# Patient Record
Sex: FEMALE | Race: BLACK OR AFRICAN AMERICAN | Employment: FULL TIME | URBAN - METROPOLITAN AREA
[De-identification: names, ages, dates, MRNs, and addresses within clinical notes are randomized per-mention and may not be internally consistent; named-entity substitution may affect disease eponyms.]

---

## 2018-02-16 ENCOUNTER — HOSPITAL ENCOUNTER (OUTPATIENT)
Dept: LAB | Age: 23
Discharge: HOME | End: 2018-02-16

## 2018-02-16 ENCOUNTER — HOSPITAL ENCOUNTER (EMERGENCY)
Age: 23
Discharge: HOME | End: 2018-02-16
Attending: EMERGENCY MEDICINE

## 2018-02-16 VITALS
DIASTOLIC BLOOD PRESSURE: 75 MMHG | TEMPERATURE: 99.2 F | HEART RATE: 67 BPM | BODY MASS INDEX: 22.62 KG/M2 | HEIGHT: 72 IN | WEIGHT: 167 LBS | SYSTOLIC BLOOD PRESSURE: 125 MMHG | OXYGEN SATURATION: 98 % | RESPIRATION RATE: 20 BRPM

## 2018-02-16 DIAGNOSIS — J06.9 ACUTE UPPER RESPIRATORY INFECTION: Primary | ICD-10-CM

## 2018-02-16 LAB — S PYO AG THROAT QL: NEGATIVE

## 2018-02-16 PROCEDURE — 87070 CULTURE OTHR SPECIMN AEROBIC: CPT | Performed by: EMERGENCY MEDICINE

## 2018-02-16 RX ORDER — CHLORPHENIRAMINE MALEATE 4 MG
4 TABLET ORAL
Qty: 20 TAB | Refills: 0 | Status: SHIPPED | OUTPATIENT
Start: 2018-02-16 | End: 2018-12-26

## 2018-02-16 RX ORDER — DESOGESTREL AND ETHINYL ESTRADIOL 0.15-0.03
1 KIT ORAL DAILY
COMMUNITY
End: 2020-01-08

## 2018-02-17 NOTE — UC PROVIDER NOTE
Patient is a 20 y.o. female presenting with cold symptoms. The history is provided by the patient. Cold Symptoms    This is a new (started off as rhinorrhea, nasal/sinus congestion, post nasal drainage. sxs progressed, and pt became more fatigued, bodyaches, sore throat, swollen glands.) problem. Episode onset: 3 days ago. The problem has been gradually worsening. Maximum temperature: \"low grade\" 99. Associated symptoms include congestion, ear pain, rhinorrhea, sore throat and cough. Pertinent negatives include no abdominal pain, no diarrhea, no nausea, no vomiting, no neck pain, no rash and no wheezing. Associated symptoms comments: She works in [employer] and has been exposed to various illnesses. Treatments tried: OTC cough/cold medications. The treatment provided moderate relief. Past Medical History:   Diagnosis Date    Bronchiolitis 3/3/2010    Eczema 3/3/2010    ROM (ruptured membranes) 3/3/2010    Strep throat 3/3/2010    UTI (urinary tract infection) 3/3/2010        No past surgical history on file. No family history on file. Social History     Social History    Marital status: SINGLE     Spouse name: N/A    Number of children: N/A    Years of education: N/A     Occupational History    Not on file. Social History Main Topics    Smoking status: Never Smoker    Smokeless tobacco: Never Used    Alcohol use No    Drug use: No    Sexual activity: No     Other Topics Concern    Not on file     Social History Narrative                ALLERGIES: Amoxicillin and Griseofulvin    Review of Systems   Constitutional: Positive for chills, fatigue and fever. HENT: Positive for congestion, ear pain, postnasal drip, rhinorrhea, sinus pressure and sore throat. Negative for facial swelling. Respiratory: Positive for cough. Negative for wheezing. Gastrointestinal: Negative for abdominal pain, diarrhea, nausea and vomiting. Musculoskeletal: Positive for myalgias.  Negative for neck pain and neck stiffness. Skin: Negative for rash. Vitals:    02/16/18 1841   BP: 125/75   Pulse: 67   Resp: 20   Temp: 99.2 °F (37.3 °C)   SpO2: 98%   Weight: 75.8 kg (167 lb)   Height: 6' (1.829 m)       Physical Exam   Constitutional: She is oriented to person, place, and time. She appears well-developed and well-nourished. No distress. HENT:   Nose: Rhinorrhea present. Mouth/Throat: Oropharynx is clear and moist and mucous membranes are normal. No oropharyngeal exudate or posterior oropharyngeal erythema. Eyes: Conjunctivae and EOM are normal. Pupils are equal, round, and reactive to light. Right eye exhibits no discharge. Left eye exhibits no discharge. No scleral icterus. Neck: Normal range of motion. Neck supple. No JVD present. Cardiovascular: Normal rate, regular rhythm, normal heart sounds and intact distal pulses. No murmur heard. Pulmonary/Chest: Effort normal and breath sounds normal. No stridor. No respiratory distress. She has no wheezes. She has no rales. Abdominal: Soft. Bowel sounds are normal. She exhibits no distension. There is no tenderness. There is no rebound and no guarding. Musculoskeletal: She exhibits no edema or tenderness. Lymphadenopathy:     She has cervical adenopathy. Right cervical: Posterior cervical adenopathy present. Left cervical: Posterior cervical adenopathy present. Neurological: She is alert and oriented to person, place, and time. Skin: Skin is warm and dry. No rash noted. She is not diaphoretic. Psychiatric: She has a normal mood and affect. Nursing note and vitals reviewed. MDM     Differential Diagnosis; Clinical Impression; Plan:     Flu-like illness. Outside of window for tamiflu. Strep negative. Pt nontoxic, afebrile here, with reassuring exam. Supportive care.        Procedures

## 2018-02-17 NOTE — DISCHARGE INSTRUCTIONS
Upper Respiratory Infection (Cold): Care Instructions  Your Care Instructions    An upper respiratory infection, or URI, is an infection of the nose, sinuses, or throat. URIs are spread by coughs, sneezes, and direct contact. The common cold is the most frequent kind of URI. The flu and sinus infections are other kinds of URIs. Almost all URIs are caused by viruses. Antibiotics won't cure them. But you can treat most infections with home care. This may include drinking lots of fluids and taking over-the-counter pain medicine. You will probably feel better in 4 to 10 days. The doctor has checked you carefully, but problems can develop later. If you notice any problems or new symptoms, get medical treatment right away. Follow-up care is a key part of your treatment and safety. Be sure to make and go to all appointments, and call your doctor if you are having problems. It's also a good idea to know your test results and keep a list of the medicines you take. How can you care for yourself at home? · To prevent dehydration, drink plenty of fluids, enough so that your urine is light yellow or clear like water. Choose water and other caffeine-free clear liquids until you feel better. If you have kidney, heart, or liver disease and have to limit fluids, talk with your doctor before you increase the amount of fluids you drink. · Take an over-the-counter pain medicine, such as acetaminophen (Tylenol), ibuprofen (Advil, Motrin), or naproxen (Aleve). Read and follow all instructions on the label. · Before you use cough and cold medicines, check the label. These medicines may not be safe for young children or for people with certain health problems. · Be careful when taking over-the-counter cold or flu medicines and Tylenol at the same time. Many of these medicines have acetaminophen, which is Tylenol. Read the labels to make sure that you are not taking more than the recommended dose.  Too much acetaminophen (Tylenol) can be harmful. · Get plenty of rest.  · Do not smoke or allow others to smoke around you. If you need help quitting, talk to your doctor about stop-smoking programs and medicines. These can increase your chances of quitting for good. When should you call for help? Call 911 anytime you think you may need emergency care. For example, call if:  ? · You have severe trouble breathing. ?Call your doctor now or seek immediate medical care if:  ? · You seem to be getting much sicker. ? · You have new or worse trouble breathing. ? · You have a new or higher fever. ? · You have a new rash. ? Watch closely for changes in your health, and be sure to contact your doctor if:  ? · You have a new symptom, such as a sore throat, an earache, or sinus pain. ? · You cough more deeply or more often, especially if you notice more mucus or a change in the color of your mucus. ? · You do not get better as expected. Where can you learn more? Go to http://michael-jordan.info/. Enter J935 in the search box to learn more about \"Upper Respiratory Infection (Cold): Care Instructions. \"  Current as of: May 12, 2017  Content Version: 11.4  © 7658-1034 [employer]. Care instructions adapted under license by [employer] (which disclaims liability or warranty for this information). If you have questions about a medical condition or this instruction, always ask your healthcare professional. [place] [address] any warranty or liability for your use of this information.

## 2018-02-18 LAB
BACTERIA SPEC CULT: NORMAL
SERVICE CMNT-IMP: NORMAL

## 2018-12-18 ENCOUNTER — OFFICE VISIT (OUTPATIENT)
Dept: URGENT CARE | Age: 23
End: 2018-12-18

## 2018-12-18 VITALS
HEIGHT: 71 IN | OXYGEN SATURATION: 99 % | BODY MASS INDEX: 25.76 KG/M2 | SYSTOLIC BLOOD PRESSURE: 124 MMHG | HEART RATE: 93 BPM | TEMPERATURE: 97.6 F | RESPIRATION RATE: 16 BRPM | WEIGHT: 184 LBS | DIASTOLIC BLOOD PRESSURE: 74 MMHG

## 2018-12-18 DIAGNOSIS — J01.90 ACUTE SINUSITIS, RECURRENCE NOT SPECIFIED, UNSPECIFIED LOCATION: ICD-10-CM

## 2018-12-18 DIAGNOSIS — J20.9 ACUTE BRONCHITIS, UNSPECIFIED ORGANISM: Primary | ICD-10-CM

## 2018-12-18 LAB
HCG URINE, QL. (POC): NEGATIVE
VALID INTERNAL CONTROL?: YES

## 2018-12-18 RX ORDER — GUAIFENESIN 600 MG/1
600 TABLET, EXTENDED RELEASE ORAL 2 TIMES DAILY
Qty: 14 TAB | Refills: 0 | Status: SHIPPED | OUTPATIENT
Start: 2018-12-18 | End: 2018-12-25

## 2018-12-18 RX ORDER — PREDNISONE 20 MG/1
TABLET ORAL
Qty: 8 TAB | Refills: 0 | Status: SHIPPED | OUTPATIENT
Start: 2018-12-18 | End: 2018-12-26

## 2018-12-18 RX ORDER — AZITHROMYCIN 250 MG/1
TABLET, FILM COATED ORAL
Qty: 6 TAB | Refills: 0 | Status: SHIPPED | OUTPATIENT
Start: 2018-12-18 | End: 2018-12-26

## 2018-12-18 NOTE — LETTER
NOTIFICATION RETURN TO WORK / SCHOOL 
 
12/18/2018 3:37 PM 
 
Ms. Isidra Medina [address] [id number] To Whom It May Concern: 
 
Isidra Medina is currently under the care of [address] [place] [address]. She will return to work/school on: 12/20 OR 12/21/2018 If there are questions or concerns please have the patient contact our office. Sincerely, [facility] PROVIDER Eleazar Arevalo NP

## 2018-12-18 NOTE — PROGRESS NOTES
Cough and nasal congestion for almost 1 month  Constant, all day long. No aggravating or alleviating factors  It is dry to productive  Has been drying OTC mucinex without much relief. Denies fever, chills, pleuritic pain, SOB, nausea or vomiting   has a past medical history of Bronchiolitis, Eczema, , Strep throat, and UTI (urinary tract infection). LMP 2 weeks ago. Past Medical History:   Diagnosis Date    Bronchiolitis 3/3/2010    Eczema 3/3/2010    ROM (ruptured membranes) 3/3/2010    Strep throat 3/3/2010    UTI (urinary tract infection) 3/3/2010        Past Surgical History:   Procedure Laterality Date    HX HEENT      lasik          History reviewed. No pertinent family history. Social History     Socioeconomic History    Marital status: SINGLE     Spouse name: Not on file    Number of children: Not on file    Years of education: Not on file    Highest education level: Not on file   Social Needs    Financial resource strain: Not on file    Food insecurity - worry: Not on file    Food insecurity - inability: Not on file    Transportation needs - medical: Not on file   [employer] needs - non-medical: Not on file   Occupational History    Not on file   Tobacco Use    Smoking status: Never Smoker    Smokeless tobacco: Never Used   Substance and Sexual Activity    Alcohol use: No    Drug use: No    Sexual activity: No   Other Topics Concern    Not on file   Social History Narrative    Not on file                ALLERGIES: Amoxicillin and Griseofulvin    Review of Systems   Neurological: Negative for dizziness. All other systems reviewed and are negative. Vitals:    12/18/18 1501   BP: 124/74   Pulse: 93   Resp: 16   Temp: 97.6 °F (36.4 °C)   SpO2: 99%   Weight: 184 lb (83.5 kg)   Height: 5' 11\" (1.803 m)       Physical Exam   Constitutional: She is oriented to person, place, and time. No distress.    Non-toxic appearing, well hydrated   HENT:   Greenish whitish mucus in nasal passages bilat with decreased nasal patency. OP with post nasal drip otherwise no lesion or exudate. TMs normal.   Eyes: Conjunctivae and EOM are normal. Pupils are equal, round, and reactive to light. No scleral icterus. Neck: Normal range of motion. Neck supple. Cardiovascular: Normal rate, regular rhythm and normal heart sounds. Exam reveals no gallop and no friction rub. No murmur heard. Pulmonary/Chest: Effort normal and breath sounds normal. No respiratory distress. She has no wheezes. She has no rales. Lymphadenopathy:     She has no cervical adenopathy. Neurological: She is alert and oriented to person, place, and time. Skin: Skin is warm and dry. No rash noted. She is not diaphoretic. No erythema. No pallor. Psychiatric: She has a normal mood and affect. Her behavior is normal. Thought content normal.   Nursing note and vitals reviewed. MDM     Differential Diagnosis; Clinical Impression; Plan:       CLINICAL IMPRESSION:  (J20.9) Acute bronchitis, unspecified organism  (primary encounter diagnosis)  (J01.90) Acute sinusitis, recurrence not specified, unspecified location    Orders Placed This Encounter      XR CHEST PA LAT      AMB POC URINE PREGNANCY TEST, VISUAL COLOR COMPARISON    RX      azithromycin (ZITHROMAX) 250 mg tablet      predniSONE (DELTASONE) 20 mg tablet      guaiFENesin ER (MUCINEX) 600 mg ER tablet      Plan:   1. UPT negative. See above orders  2. Additionally advised humidified air at night, maintaining adequate fluid intake  3. CXR no acute process  4. Review handouts      We have reviewed concerning signs/symptoms, normal vs abnormal progression of medical condition and when to seek immediate medical attention. Schedule with PCP or Urgent Care immediately for worsening or new symptoms. See your PCP if there is not at least some improvement in symptoms within the next 1 week  You should see your PCP for updates on your routine health maintenance. XR Results (most recent):  Results from Appointment encounter on 12/18/18   XR CHEST PA LAT    Narrative INDICATION:   cough     EXAM:  PA and Lateral Chest Radiographs    COMPARISON: None    FINDINGS:    PA and lateral views of the chest demonstrate a normal cardiomediastinal  silhouette. The lungs are adequately expanded. There is no edema, effusion,  consolidation, or pneumothorax. The osseous structures are unremarkable. Metallic nipple bars are noted. Impression IMPRESSION:  No acute process.           Procedures

## 2018-12-18 NOTE — PATIENT INSTRUCTIONS
Follow up with PCP without improvement over next 5-7 days sooner/immediately for new or worsening       Bronchitis: Care Instructions  Your Care Instructions    Bronchitis is inflammation of the bronchial tubes, which carry air to the lungs. The tubes swell and produce mucus, or phlegm. The mucus and inflamed bronchial tubes make you cough. You may have trouble breathing. Most cases of bronchitis are caused by viruses like those that cause colds. Antibiotics usually do not help and they may be harmful. Bronchitis usually develops rapidly and lasts about 2 to 3 weeks in otherwise healthy people. Follow-up care is a key part of your treatment and safety. Be sure to make and go to all appointments, and call your doctor if you are having problems. It's also a good idea to know your test results and keep a list of the medicines you take. How can you care for yourself at home? · Take all medicines exactly as prescribed. Call your doctor if you think you are having a problem with your medicine. · Get some extra rest.  · Take an over-the-counter pain medicine, such as acetaminophen (Tylenol), ibuprofen (Advil, Motrin), or naproxen (Aleve) to reduce fever and relieve body aches. Read and follow all instructions on the label. · Do not take two or more pain medicines at the same time unless the doctor told you to. Many pain medicines have acetaminophen, which is Tylenol. Too much acetaminophen (Tylenol) can be harmful. · Take an over-the-counter cough medicine that contains dextromethorphan to help quiet a dry, hacking cough so that you can sleep. Avoid cough medicines that have more than one active ingredient. Read and follow all instructions on the label. · Breathe moist air from a humidifier, hot shower, or sink filled with hot water. The heat and moisture will thin mucus so you can cough it out. · Do not smoke. Smoking can make bronchitis worse.  If you need help quitting, talk to your doctor about stop-smoking programs and medicines. These can increase your chances of quitting for good. When should you call for help? Call 911 anytime you think you may need emergency care. For example, call if:    · You have severe trouble breathing.    Call your doctor now or seek immediate medical care if:    · You have new or worse trouble breathing.     · You cough up dark brown or bloody mucus (sputum).     · You have a new or higher fever.     · You have a new rash.    Watch closely for changes in your health, and be sure to contact your doctor if:    · You cough more deeply or more often, especially if you notice more mucus or a change in the color of your mucus.     · You are not getting better as expected. Where can you learn more? Go to http://michael-jordan.info/. Enter H333 in the search box to learn more about \"Bronchitis: Care Instructions. \"  Current as of: December 6, 2017  Content Version: 11.8  © 0499-8064 [employer]. Care instructions adapted under license by [employer] (which disclaims liability or warranty for this information). If you have questions about a medical condition or this instruction, always ask your healthcare professional. [place] [address] any warranty or liability for your use of this information. Sinusitis: Care Instructions  Your Care Instructions    Sinusitis is an infection of the lining of the sinus cavities in your head. Sinusitis often follows a cold. It causes pain and pressure in your head and face. In most cases, sinusitis gets better on its own in 1 to 2 weeks. But some mild symptoms may last for several weeks. Sometimes antibiotics are needed. Follow-up care is a key part of your treatment and safety. Be sure to make and go to all appointments, and call your doctor if you are having problems. It's also a good idea to know your test results and keep a list of the medicines you take.   How can you care for yourself at home? · Take an over-the-counter pain medicine, such as acetaminophen (Tylenol), ibuprofen (Advil, Motrin), or naproxen (Aleve). Read and follow all instructions on the label. · If the doctor prescribed antibiotics, take them as directed. Do not stop taking them just because you feel better. You need to take the full course of antibiotics. · Be careful when taking over-the-counter cold or flu medicines and Tylenol at the same time. Many of these medicines have acetaminophen, which is Tylenol. Read the labels to make sure that you are not taking more than the recommended dose. Too much acetaminophen (Tylenol) can be harmful. · Breathe warm, moist air from a steamy shower, a hot bath, or a sink filled with hot water. Avoid cold, dry air. Using a humidifier in your home may help. Follow the directions for cleaning the machine. · Use saline (saltwater) nasal washes to help keep your nasal passages open and wash out mucus and bacteria. You can buy saline nose drops at a grocery store or drugstore. Or you can make your own at home by adding 1 teaspoon of salt and 1 teaspoon of baking soda to 2 cups of distilled water. If you make your own, fill a bulb syringe with the solution, insert the tip into your nostril, and squeeze gently. [place] your nose. · Put a hot, wet towel or a warm gel pack on your face 3 or 4 times a day for 5 to 10 minutes each time. · Try a decongestant nasal spray like oxymetazoline (Afrin). Do not use it for more than 3 days in a row. Using it for more than 3 days can make your congestion worse. When should you call for help?   Call your doctor now or seek immediate medical care if:    · You have new or worse swelling or redness in your face or around your eyes.     · You have a new or higher fever.    Watch closely for changes in your health, and be sure to contact your doctor if:    · You have new or worse facial pain.     · The mucus from your nose becomes thicker (like pus) or has new blood in it.     · You are not getting better as expected. Where can you learn more? Go to http://michael-jordan.info/. Enter S829 in the search box to learn more about \"Sinusitis: Care Instructions. \"  Current as of: March 28, 2018  Content Version: 11.8  © 9696-3572 [employer]. Care instructions adapted under license by [employer] (which disclaims liability or warranty for this information). If you have questions about a medical condition or this instruction, always ask your healthcare professional. [address] any warranty or liability for your use of this information.

## 2018-12-26 ENCOUNTER — OFFICE VISIT (OUTPATIENT)
Dept: URGENT CARE | Age: 23
End: 2018-12-26

## 2018-12-26 VITALS
OXYGEN SATURATION: 98 % | WEIGHT: 182 LBS | HEIGHT: 71 IN | DIASTOLIC BLOOD PRESSURE: 78 MMHG | BODY MASS INDEX: 25.48 KG/M2 | TEMPERATURE: 98.1 F | HEART RATE: 92 BPM | RESPIRATION RATE: 16 BRPM | SYSTOLIC BLOOD PRESSURE: 147 MMHG

## 2018-12-26 DIAGNOSIS — J02.9 SORE THROAT: ICD-10-CM

## 2018-12-26 DIAGNOSIS — M79.602 LEFT ARM PAIN: Primary | ICD-10-CM

## 2018-12-26 DIAGNOSIS — L04.9 LYMPHADENITIS, ACUTE: ICD-10-CM

## 2018-12-26 RX ORDER — CLINDAMYCIN HYDROCHLORIDE 300 MG/1
300 CAPSULE ORAL 3 TIMES DAILY
Qty: 30 CAP | Refills: 0 | Status: SHIPPED | OUTPATIENT
Start: 2018-12-26 | End: 2019-01-05

## 2018-12-26 NOTE — PATIENT INSTRUCTIONS
Contusion: Care Instructions  Your Care Instructions    Contusion is the medical term for a bruise. It is the result of a direct blow or an impact, such as a fall. Contusions are common sports injuries. Most people think of a bruise as a black-and-blue spot. This happens when small blood vessels get torn and leak blood under the skin. But bones, muscles, and organs can also get bruised. This may damage deep tissues but not cause a bruise you can see. The doctor will do a physical exam to find the location of your contusion. You may also have tests to make sure you do not have a more serious injury, such as a broken bone or nerve damage. These may include X-rays or other imaging tests like a CT scan or MRI. Deep-tissue contusions may cause pain and swelling. But if there is no serious damage, they will often get better in a few weeks with home treatment. The doctor has checked you carefully, but problems can develop later. If you notice any problems or new symptoms, get medical treatment right away. Follow-up care is a key part of your treatment and safety. Be sure to make and go to all appointments, and call your doctor if you are having problems. It's also a good idea to know your test results and keep a list of the medicines you take. How can you care for yourself at home? · Put ice or a cold pack on the sore area for 10 to 20 minutes at a time to stop swelling. Put a thin cloth between the ice pack and your skin. · Be safe with medicines. Read and follow all instructions on the label. ? If the doctor gave you a prescription medicine for pain, take it as prescribed. ? If you are not taking a prescription pain medicine, ask your doctor if you can take an over-the-counter medicine. · If you can, prop up the sore area on pillows as much as possible for the next few days. Try to keep the sore area above the level of your heart. When should you call for help?   Call your doctor now or seek immediate medical care if:    · Your pain gets worse.     · You have new or worse swelling.     · You have tingling, weakness, or numbness in the area near the contusion.     · The area near the contusion is cold or pale.    Watch closely for changes in your health, and be sure to contact your doctor if:    · You do not get better as expected. Where can you learn more? Go to http://michael-jordan.info/. Enter M401 in the search box to learn more about \"Contusion: Care Instructions. \"  Current as of: November 20, 2017  Content Version: 11.8  © 2674-8614 [employer]. Care instructions adapted under license by [employer] (which disclaims liability or warranty for this information). If you have questions about a medical condition or this instruction, always ask your healthcare professional. [address] any warranty or liability for your use of this information. Lymphadenitis: Care Instructions  Your Care Instructions  Lymph nodes are small, bean-shaped glands throughout the body. They help the body fight germs and infections. Lymphadenitis is a swelling of a lymph node. It can be caused by an infection or other condition. The infection is most often in a nearby part of the body. A common example is the lumps on both sides of your neck under the jaw that get tender and bigger when you have a cold or sore throat. Sometimes the lymph node itself may be infected. Usually the swollen lymph nodes go back to normal size without a problem. Treatment, if needed, focuses on treating the cause. For example, a bacterial infection may be treated with antibiotics. This should bring the node back to normal size. An infection caused by a virus often goes away on its own. In rare cases, a badly infected node may need to be drained by your doctor. Follow-up care is a key part of your treatment and safety.  Be sure to make and go to all appointments, and call your doctor if you are having problems. It's also a good idea to know your test results and keep a list of the medicines you take. How can you care for yourself at home? · Be safe with medicines. ? If your doctor prescribed antibiotics, take them as directed. Do not stop taking them just because you feel better. You need to take the full course of antibiotics. ? Ask your doctor if you can take an over-the-counter pain medicine, such as acetaminophen (Tylenol), ibuprofen (Advil, Motrin), or naproxen (Aleve). Read and follow all instructions on the label. · If you have pain, try a warm compress. Soak a towel or washcloth in warm water. Wring it out, and place it on the affected skin. · Do not squeeze, drain, or puncture a painful lump. Doing this can irritate or inflame the lump, push any existing infection deeper into the skin, or cause severe bleeding. When should you call for help? Call your doctor now or seek immediate medical care if:    · Your lymph nodes get bigger.     · The area becomes red and feels more tender.     · You have a fever that does not go away.    Watch closely for changes in your health, and be sure to contact your doctor if:    · You do not get better as expected. Where can you learn more? Go to http://michael-jordan.info/. Enter Y887 in the search box to learn more about \"Lymphadenitis: Care Instructions. \"  Current as of: November 18, 2017  Content Version: 11.8  © 1251-5647 [employer]. Care instructions adapted under license by [employer] (which disclaims liability or warranty for this information). If you have questions about a medical condition or this instruction, always ask your healthcare professional. [address] any warranty or liability for your use of this information.

## 2018-12-26 NOTE — PROGRESS NOTES
Papa Faulkner is a 20 y.o. female who sustained a left shoulder and left elbow injury yesterday. Mechanism of injury: was running down the stairs and slipped and fell, hyperextending left shoulder and hitting left elbow. Immediate symptoms: immediate pain, was able to use arm directly after injury. Symptoms have been constant since that time. Prior history of related problems: no prior problems with this area in the past. Also complains of left neck gland swelling, pain since yesterday along with ST. Denies fever, cough, congestion. The history is provided by the patient. Past Medical History:   Diagnosis Date    Bronchiolitis 3/3/2010    Eczema 3/3/2010    ROM (ruptured membranes) 3/3/2010    Strep throat 3/3/2010    UTI (urinary tract infection) 3/3/2010        Past Surgical History:   Procedure Laterality Date     HEENT      lasik          History reviewed. No pertinent family history. Social History     Socioeconomic History    Marital status: SINGLE     Spouse name: Not on file    Number of children: Not on file    Years of education: Not on file    Highest education level: Not on file   Social Needs    Financial resource strain: Not on file    Food insecurity - worry: Not on file    Food insecurity - inability: Not on file    Transportation needs - medical: Not on file   [employer] needs - non-medical: Not on file   Occupational History    Not on file   Tobacco Use    Smoking status: Never Smoker    Smokeless tobacco: Never Used   Substance and Sexual Activity    Alcohol use: No    Drug use: No    Sexual activity: No   Other Topics Concern    Not on file   Social History Narrative    Not on file                ALLERGIES: Amoxicillin and Griseofulvin    Review of Systems   Constitutional: Negative for activity change, appetite change, chills and fever. HENT: Positive for sore throat.  Negative for congestion, ear pain, rhinorrhea, sinus pressure, sinus pain and trouble swallowing. Respiratory: Negative for cough, shortness of breath and wheezing. Cardiovascular: Negative for chest pain and palpitations. Gastrointestinal: Negative for nausea and vomiting. Musculoskeletal: Positive for arthralgias. Skin: Negative for color change and rash. Neurological: Negative for dizziness and headaches. Hematological: Positive for adenopathy. Vitals:    12/26/18 1154   BP: 147/78   Pulse: 92   Resp: 16   Temp: 98.1 °F (36.7 °C)   SpO2: 98%   Weight: 182 lb (82.6 kg)   Height: 5' 11\" (1.803 m)       Physical Exam   Constitutional: She appears well-developed and well-nourished. No distress. HENT:   Right Ear: Tympanic membrane, external ear and ear canal normal.   Left Ear: Tympanic membrane, external ear and ear canal normal.   Nose: Nose normal. Right sinus exhibits no maxillary sinus tenderness and no frontal sinus tenderness. Left sinus exhibits no maxillary sinus tenderness and no frontal sinus tenderness. Mouth/Throat: Mucous membranes are normal. Posterior oropharyngeal edema and posterior oropharyngeal erythema present. No oropharyngeal exudate or tonsillar abscesses. Cardiovascular: Normal rate, regular rhythm and normal heart sounds. Pulmonary/Chest: Effort normal and breath sounds normal. No respiratory distress. She has no wheezes. She has no rales. Musculoskeletal:        Left shoulder: She exhibits tenderness, bony tenderness and pain. She exhibits normal range of motion, no swelling, no effusion, no crepitus, no deformity, no laceration, normal pulse and normal strength. Left elbow: She exhibits normal range of motion, no swelling, no effusion, no deformity and no laceration. Tenderness found. Olecranon process tenderness noted. No radial head, no medial epicondyle and no lateral epicondyle tenderness noted. Lymphadenopathy:     She has cervical adenopathy. Right cervical: Superficial cervical adenopathy present. Neurological: She is alert. Skin: She is not diaphoretic. Psychiatric: She has a normal mood and affect. Her behavior is normal. Judgment and thought content normal.   Nursing note and vitals reviewed. MDM    ICD-10-CM ICD-9-CM    1. Left arm pain M79.602 729.5 XR SHOULDER LT AP/LAT MIN 2 V      XR ELBOW LT MIN 3 V    contusion   2. Lymphadenitis, acute L04.9 683    3. Sore throat J02.9 462      Medications Ordered Today   Medications    clindamycin (CLEOCIN) 300 mg capsule     Sig: Take 1 Cap by mouth three (3) times daily for 10 days. Dispense:  30 Cap     Refill:  0     NSAIDs prn    The patients condition was discussed with the patient and they understand. The patient is to follow up with PCP [facility]. If signs and symptoms become worse the pt is to go to the ER. The patient is to take medications as prescribed.      Xrays: unremarkable        Procedures

## 2019-09-18 ENCOUNTER — OFFICE VISIT (OUTPATIENT)
Dept: URGENT CARE | Age: 24
End: 2019-09-18

## 2019-09-18 VITALS
DIASTOLIC BLOOD PRESSURE: 66 MMHG | SYSTOLIC BLOOD PRESSURE: 122 MMHG | HEART RATE: 108 BPM | BODY MASS INDEX: 25.62 KG/M2 | HEIGHT: 71 IN | RESPIRATION RATE: 16 BRPM | TEMPERATURE: 100 F | WEIGHT: 183 LBS | OXYGEN SATURATION: 99 %

## 2019-09-18 DIAGNOSIS — J03.90 ACUTE TONSILLITIS, UNSPECIFIED ETIOLOGY: Primary | ICD-10-CM

## 2019-09-18 DIAGNOSIS — J02.9 SORE THROAT: ICD-10-CM

## 2019-09-18 LAB
S PYO AG THROAT QL: NORMAL
VALID INTERNAL CONTROL?: YES

## 2019-09-18 RX ORDER — AZITHROMYCIN 250 MG/1
TABLET, FILM COATED ORAL
Qty: 6 TAB | Refills: 0 | Status: SHIPPED | OUTPATIENT
Start: 2019-09-18 | End: 2020-01-08

## 2019-09-18 NOTE — LETTER
NOTIFICATION RETURN TO WORK / SCHOOL 
 
9/18/2019 8:10 PM 
 
Ms. Tracie Williamson [address] [id number] To Whom It May Concern: 
 
Tracie Williamson is currently under the care of [facility]. She will return to work/school on: 09/20 OR 09/21/2019 If there are questions or concerns please have the patient contact our office. Sincerely, GHE PROVIDER

## 2019-09-19 NOTE — PATIENT INSTRUCTIONS
Follow up with PCP without improvement over next 4 days sooner/immediately for new or worsening       Tonsillitis: Care Instructions  Your Care Instructions    Tonsillitis is an infection of the tonsils that is caused by bacteria or a virus. The tonsils are in the back of the throat and are part of the immune system. Tonsillitis typically lasts from a few days up to a couple of weeks. Tonsillitis caused by a virus goes away on its own. Tonsillitis caused by the bacteria that causes strep throat is treated with antibiotics. You and your doctor may consider surgery to remove the tonsils (tonsillectomy) if you have serious complications or repeat infections. Follow-up care is a key part of your treatment and safety. Be sure to make and go to all appointments, and call your doctor if you are having problems. It's also a good idea to know your test results and keep a list of the medicines you take. How can you care for yourself at home? · If your doctor prescribed antibiotics, take them as directed. Do not stop taking them just because you feel better. You need to take the full course of antibiotics. · Gargle with warm salt water. This helps reduce swelling and relieve discomfort. Gargle once an hour with 1 teaspoon of salt mixed in 8 fluid ounces of warm water. · Take an over-the-counter pain medicine, such as acetaminophen (Tylenol), ibuprofen (Advil, Motrin), or naproxen (Aleve). Be safe with medicines. Read and follow all instructions on the label. No one younger than 20 should take aspirin. It has been linked to Reye syndrome, a serious illness. · Be careful when taking over-the-counter cold or flu medicines and Tylenol at the same time. Many of these medicines have acetaminophen, which is Tylenol. Read the labels to make sure that you are not taking more than the recommended dose. Too much acetaminophen (Tylenol) can be harmful. · Try an over-the-counter throat spray to relieve throat pain.   · Drink plenty of fluids. Fluids may help soothe an irritated throat. Drink warm or cool liquids (whichever feels better). These include tea, soup, and juice. · Do not smoke, and avoid secondhand smoke. Smoking can make tonsillitis worse. If you need help quitting, talk to your doctor about stop-smoking programs and medicines. These can increase your chances of quitting for good. · Use a vaporizer or humidifier to add moisture to your bedroom. Follow the directions for cleaning the machine. When should you call for help? Call your doctor now or seek immediate medical care if:    · Your pain gets worse on one side of your throat.     · You have a new or higher fever.     · You notice changes in your voice.     · You have trouble opening your mouth.     · You have any trouble breathing.     · You have much more trouble swallowing.     · You have a fever with a stiff neck or a severe headache.     · You are sensitive to light or feel very sleepy or confused.    Watch closely for changes in your health, and be sure to contact your doctor if:    · You do not get better after 2 days. Where can you learn more? Go to http://michael-jordan.info/. Enter S639 in the search box to learn more about \"Tonsillitis: Care Instructions. \"  Current as of: October 21, 2018  Content Version: 12.1  © 2345-0996 Healthwise, Incorporated. Care instructions adapted under license by [employer] (which disclaims liability or warranty for this information). If you have questions about a medical condition or this instruction, always ask your healthcare professional. [address] [address] any warranty or liability for your use of this information.

## 2019-09-19 NOTE — PROGRESS NOTES
Accompanied by mother  Here for sore throat, swollen lymph nodes and fever < 24 hours  Pain 6/10. No trouble swallowing  Constant. hasnt tried meds. 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 Denies rash, cough, headache, voice change  Overall worse. Past Medical History:   Diagnosis Date    Bronchiolitis 3/3/2010    Eczema 3/3/2010    ROM (ruptured membranes) 3/3/2010    Strep throat 3/3/2010    UTI (urinary tract infection) 3/3/2010        Past Surgical History:   Procedure Laterality Date    HX HEENT      lasik          History reviewed. No pertinent family history.      Social History     Socioeconomic History    Marital status: SINGLE     Spouse name: Not on file    Number of children: Not on file    Years of education: Not on file    Highest education level: Not on file   Occupational History    Not on file   Social Needs    Financial resource strain: Not on file    Food insecurity:     Worry: Not on file     Inability: Not on file    Transportation needs:     Medical: Not on file     Non-medical: Not on file   Tobacco Use    Smoking status: Never Smoker    Smokeless tobacco: Never Used   Substance and Sexual Activity    Alcohol use: No    Drug use: No    Sexual activity: Never   Lifestyle    Physical activity:     Days per week: Not on file     Minutes per session: Not on file    Stress: Not on file   Relationships    Social connections:     Talks on phone: Not on file     Gets together: Not on file     Attends Islam service: Not on file     Active member of club or organization: Not on file     Attends meetings of clubs or organizations: Not on file     Relationship status: Not on file    Intimate partner violence:     Fear of current or ex partner: Not on file     Emotionally abused: Not on file     Physically abused: Not on file     Forced sexual activity: Not on file   Other Topics Concern    Not on file   Social History Narrative    Not on file ALLERGIES: Amoxicillin and Griseofulvin    Review of Systems   All other systems reviewed and are negative. Vitals:    09/18/19 1948   BP: 122/66   Pulse: (!) 108   Resp: 16   Temp: 100 °F (37.8 °C)   SpO2: 99%   Weight: 183 lb (83 kg)   Height: 5' 11\" (1.803 m)       Physical Exam   Constitutional: She is oriented to person, place, and time. No distress. Non-toxic appearing, well hydrated   HENT:   Tonsils 2+ erythematous. Uvula midline. TMs normal. Mucus membranes moist.    Eyes: Pupils are equal, round, and reactive to light. Conjunctivae and EOM are normal. No scleral icterus. Neck: Normal range of motion. Neck supple. Cardiovascular: Normal rate, regular rhythm and normal heart sounds. Exam reveals no gallop and no friction rub. No murmur heard. Pulmonary/Chest: Effort normal and breath sounds normal. No respiratory distress. She has no wheezes. She has no rales. Lymphadenopathy:     She has cervical adenopathy. Neurological: She is alert and oriented to person, place, and time. Skin: Skin is warm and dry. No rash noted. She is not diaphoretic. No erythema. No pallor. Psychiatric: She has a normal mood and affect. Her behavior is normal. Thought content normal.   Nursing note and vitals reviewed. MDM     Differential Diagnosis; Clinical Impression; Plan:       CLINICAL IMPRESSION:  (J03.90) Acute tonsillitis, unspecified etiology  (primary encounter diagnosis)  (J02.9) Sore throat    Orders Placed This Encounter      azithromycin (ZITHROMAX) 250 mg tablet          Sig: Take 2 tablets on day 1 then 1 tablet per day for remaining 4 days. Take by mouth.           Dispense:  6 Tab          Refill:  0      Plan:  Rapid strep negative  Will send culture  Start abx while awaiting culture  Fluids, motrin, tylenol, gargles  Review handouts      We have reviewed concerning signs/symptoms, normal vs abnormal progression of medical condition and when to seek immediate medical attention. Schedule with PCP or Urgent Care immediately for worsening or new symptoms. See your PCP if there is not at least some improvement in symptoms within the next 4-5 days. You should see your PCP for updates on your routine health maintenance.              Procedures

## 2019-09-21 LAB
BACTERIA SPEC RESP CULT: ABNORMAL
BACTERIA SPEC RESP CULT: ABNORMAL

## 2020-01-08 ENCOUNTER — OFFICE VISIT (OUTPATIENT)
Dept: URGENT CARE | Age: 25
End: 2020-01-08

## 2020-01-08 VITALS
RESPIRATION RATE: 16 BRPM | HEIGHT: 71 IN | WEIGHT: 185 LBS | DIASTOLIC BLOOD PRESSURE: 70 MMHG | BODY MASS INDEX: 25.9 KG/M2 | SYSTOLIC BLOOD PRESSURE: 143 MMHG | HEART RATE: 88 BPM | OXYGEN SATURATION: 96 % | TEMPERATURE: 98.3 F

## 2020-01-08 DIAGNOSIS — J06.9 VIRAL UPPER RESPIRATORY TRACT INFECTION: Primary | ICD-10-CM

## 2020-01-08 LAB
S PYO AG THROAT QL: NEGATIVE
VALID INTERNAL CONTROL?: YES

## 2020-01-08 RX ORDER — PREDNISONE 20 MG/1
20 TABLET ORAL 2 TIMES DAILY
Qty: 10 TAB | Refills: 0 | Status: SHIPPED | OUTPATIENT
Start: 2020-01-08 | End: 2020-01-13

## 2020-01-08 NOTE — PROGRESS NOTES
Cold Symptoms   The history is provided by the patient. This is a new problem. The current episode started more than 2 days ago. The problem occurs constantly. The problem has been gradually worsening. The cough is non-productive. There has been no fever. Associated symptoms include headaches, rhinorrhea and sore throat. Pertinent negatives include no shortness of breath and no wheezing. She has tried nothing for the symptoms. She is not a smoker. Her past medical history does not include asthma. Past Medical History:   Diagnosis Date    Bronchiolitis 3/3/2010    Eczema 3/3/2010    ROM (ruptured membranes) 3/3/2010    Strep throat 3/3/2010    UTI (urinary tract infection) 3/3/2010        Past Surgical History:   Procedure Laterality Date    HX HEENT      lasik          History reviewed. No pertinent family history.      Social History     Socioeconomic History    Marital status: SINGLE     Spouse name: Not on file    Number of children: Not on file    Years of education: Not on file    Highest education level: Not on file   Occupational History    Not on file   Social Needs    Financial resource strain: Not on file    Food insecurity:     Worry: Not on file     Inability: Not on file    Transportation needs:     Medical: Not on file     Non-medical: Not on file   Tobacco Use    Smoking status: Never Smoker    Smokeless tobacco: Never Used   Substance and Sexual Activity    Alcohol use: No    Drug use: No    Sexual activity: Never   Lifestyle    Physical activity:     Days per week: Not on file     Minutes per session: Not on file    Stress: Not on file   Relationships    Social connections:     Talks on phone: Not on file     Gets together: Not on file     Attends Adventism service: Not on file     Active member of club or organization: Not on file     Attends meetings of clubs or organizations: Not on file     Relationship status: Not on file    Intimate partner violence:     Fear of current or ex partner: Not on file     Emotionally abused: Not on file     Physically abused: Not on file     Forced sexual activity: Not on file   Other Topics Concern    Not on file   Social History Narrative    Not on file         ALLERGIES: Amoxicillin and Griseofulvin    Review of Systems   HENT: Positive for congestion, rhinorrhea and sore throat. Respiratory: Negative for shortness of breath and wheezing. Neurological: Positive for headaches. All other systems reviewed and are negative. Vitals:    01/08/20 1612   BP: 143/70   Pulse: 88   Resp: 16   Temp: 98.3 °F (36.8 °C)   SpO2: 96%   Weight: 185 lb (83.9 kg)   Height: 5' 11\" (1.803 m)       Physical Exam    MDM    Procedures        ICD-10-CM ICD-9-CM    1. Viral upper respiratory tract infection J06.9 465.9 AMB POC RAPID STREP A    rapid strep- negative       Fluids/ gargles  Claritin/ allegra   Tylenol cold-sinus - max strength 1-2 tab 4 times/ day    with Advil as needed    If not better in 4-5 days - may use prednisone    Medications Ordered Today   Medications    predniSONE (DELTASONE) 20 mg tablet     Sig: Take 20 mg by mouth two (2) times a day for 5 days. With food     Dispense:  10 Tab     Refill:  0     Results for orders placed or performed in visit on 01/08/20   AMB POC RAPID STREP A   Result Value Ref Range    VALID INTERNAL CONTROL POC Yes     Group A Strep Ag Negative Negative     The patients condition was discussed with the patient and they understand. The patient is to follow up with primary care doctor. If signs and symptoms become worse the pt is to go to the ER. The patient is to take medications as prescribed.

## 2020-01-08 NOTE — PATIENT INSTRUCTIONS
Fluids/ gargles  Claritin/ allegra   Tylenol cold-sinus - max strength 1-2 tab 4 times/ day    with Advil as needed    If not better in 4-5 days - may use prednisone       Viral Respiratory Infection: Care Instructions  Your Care Instructions    Viruses are very small organisms. They grow in number after they enter your body. There are many types that cause different illnesses, such as colds and the mumps. The symptoms of a viral respiratory infection often start quickly. They include a fever, sore throat, and runny nose. You may also just not feel well. Or you may not want to eat much. Most viral respiratory infections are not serious. They usually get better with time and self-care. Antibiotics are not used to treat a viral infection. That's because antibiotics will not help cure a viral illness. In some cases, antiviral medicine can help your body fight a serious viral infection. Follow-up care is a key part of your treatment and safety. Be sure to make and go to all appointments, and call your doctor if you are having problems. It's also a good idea to know your test results and keep a list of the medicines you take. How can you care for yourself at home? · Rest as much as possible until you feel better. · Be safe with medicines. Take your medicine exactly as prescribed. Call your doctor if you think you are having a problem with your medicine. You will get more details on the specific medicine your doctor prescribes. · Take an over-the-counter pain medicine, such as acetaminophen (Tylenol), ibuprofen (Advil, Motrin), or naproxen (Aleve), as needed for pain and fever. Read and follow all instructions on the label. Do not give aspirin to anyone younger than 20. It has been linked to Reye syndrome, a serious illness. · Drink plenty of fluids, enough so that your urine is light yellow or clear like water. Hot fluids, such as tea or soup, may help relieve congestion in your nose and throat.  If you have kidney, heart, or liver disease and have to limit fluids, talk with your doctor before you increase the amount of fluids you drink. · Try to clear mucus from your lungs by breathing deeply and coughing. · Gargle with warm salt water once an hour. This can help reduce swelling and throat pain. Use 1 teaspoon of salt mixed in 1 cup of warm water. · Do not smoke or allow others to smoke around you. If you need help quitting, talk to your doctor about stop-smoking programs and medicines. These can increase your chances of quitting for good. To avoid spreading the virus  · Cough or sneeze into a tissue. Then throw the tissue away. · If you don't have a tissue, use your hand to cover your cough or sneeze. Then clean your hand. You can also cough into your sleeve. · Wash your hands often. Use soap and warm water. Wash for 15 to 20 seconds each time. · If you don't have soap and water near you, you can clean your hands with alcohol wipes or gel. When should you call for help? Call your doctor now or seek immediate medical care if:    · You have a new or higher fever.     · Your fever lasts more than 48 hours.     · You have trouble breathing.     · You have a fever with a stiff neck or a severe headache.     · You are sensitive to light.     · You feel very sleepy or confused.    Watch closely for changes in your health, and be sure to contact your doctor if:    · You do not get better as expected. Where can you learn more? Go to http://michael-jordan.info/. Enter K480 in the search box to learn more about \"Viral Respiratory Infection: Care Instructions. \"  Current as of: June 9, 2019  Content Version: 12.2  © 8711-7205 [employer]. Care instructions adapted under license by [employer] (which disclaims liability or warranty for this information).  If you have questions about a medical condition or this instruction, always ask your healthcare professional. [employer] disclaims any warranty or liability for your use of this information.

## 2020-07-17 ENCOUNTER — OFFICE VISIT (OUTPATIENT)
Dept: URGENT CARE | Age: 25
End: 2020-07-17

## 2020-07-17 VITALS — TEMPERATURE: 98.5 F | HEART RATE: 82 BPM | RESPIRATION RATE: 16 BRPM | OXYGEN SATURATION: 97 %

## 2020-07-17 DIAGNOSIS — J02.9 SORE THROAT: Primary | ICD-10-CM

## 2020-07-17 DIAGNOSIS — J06.9 VIRAL UPPER RESPIRATORY TRACT INFECTION: ICD-10-CM

## 2020-07-17 LAB
S PYO AG THROAT QL: NEGATIVE
VALID INTERNAL CONTROL?: YES

## 2020-07-17 NOTE — PROGRESS NOTES
The history is provided by the patient. Sore Throat    The history is provided by the patient. This is a new problem. The current episode started 2 days ago. The problem has not changed since onset. There has been no fever. Associated symptoms include congestion and cough. Pertinent negatives include no ear discharge, no ear pain, no shortness of breath and no trouble swallowing. Associated symptoms comments: Cough, sinus congestion, cough and post nasal drainage. She has tried nothing for the symptoms. Patient states she have a history of allergies. Denies fever or SOB. Past Medical History:   Diagnosis Date    Bronchiolitis 3/3/2010    Eczema 3/3/2010    ROM (ruptured membranes) 3/3/2010    Strep throat 3/3/2010    UTI (urinary tract infection) 3/3/2010        Past Surgical History:   Procedure Laterality Date    HX HEENT      lasik          History reviewed. No pertinent family history.      Social History     Socioeconomic History    Marital status: SINGLE     Spouse name: Not on file    Number of children: Not on file    Years of education: Not on file    Highest education level: Not on file   Occupational History    Not on file   Social Needs    Financial resource strain: Not on file    Food insecurity     Worry: Not on file     Inability: Not on file    Transportation needs     Medical: Not on file     Non-medical: Not on file   Tobacco Use    Smoking status: Never Smoker    Smokeless tobacco: Never Used   Substance and Sexual Activity    Alcohol use: No    Drug use: No    Sexual activity: Never   Lifestyle    Physical activity     Days per week: Not on file     Minutes per session: Not on file    Stress: Not on file   Relationships    Social connections     Talks on phone: Not on file     Gets together: Not on file     Attends [employer] service: Not on file     Active member of club or organization: Not on file     Attends meetings of clubs or organizations: Not on file Relationship status: Not on file    Intimate partner violence     Fear of current or ex partner: Not on file     Emotionally abused: Not on file     Physically abused: Not on file     Forced sexual activity: Not on file   Other Topics Concern    Not on file   Social History Narrative    Not on file          ALLERGIES: Amoxicillin and Griseofulvin    Review of Systems   Constitutional: Negative for chills and fatigue. HENT: Positive for congestion, postnasal drip, sinus pressure and sore throat. Negative for ear discharge, ear pain and trouble swallowing. Respiratory: Positive for cough. Negative for chest tightness, shortness of breath and wheezing. Cardiovascular: Negative. Gastrointestinal: Negative. Musculoskeletal: Positive for myalgias. Neurological: Negative. Vitals:    07/17/20 1238   Pulse: 82   Resp: 16   Temp: 98.5 °F (36.9 °C)   SpO2: 97%       Physical Exam  Constitutional:       General: She is not in acute distress. Appearance: Normal appearance. She is well-developed. She is not ill-appearing. HENT:      Right Ear: Tympanic membrane, ear canal and external ear normal. There is no impacted cerumen. Left Ear: Tympanic membrane, ear canal and external ear normal. There is impacted cerumen. Nose: Congestion present. No rhinorrhea. Mouth/Throat:      Pharynx: No oropharyngeal exudate or posterior oropharyngeal erythema. Comments: +1 tonsils  Neck:      Musculoskeletal: Muscular tenderness present. No neck rigidity. Cardiovascular:      Rate and Rhythm: Normal rate. Heart sounds: Normal heart sounds. Pulmonary:      Effort: Pulmonary effort is normal.      Breath sounds: Normal breath sounds. No wheezing or rhonchi. Lymphadenopathy:      Cervical: No cervical adenopathy. Neurological:      Mental Status: She is alert and oriented to person, place, and time.    Psychiatric:         Mood and Affect: Mood normal.         MDM     Differential Diagnosis; Clinical Impression; Plan:     (J02.9) Sore throat  (primary encounter diagnosis)  No orders of the defined types were placed in this encounter.    - Advised patient to use a daily nasal saline spray, decongestant and antihistamine to help with symptoms. Use tylenol to help with symptoms.  - Symptoms are likely related to seasonal allergies. This patient was seen in [facility] at [address] [place] [address] [facility] while in their vehicle due to COVID-19 pandemic with PPE and focused examination in order to decrease community viral transmission. The patient/guardian gave verbal consent to treat.             Procedures

## 2020-07-20 DIAGNOSIS — J02.0 STREP PHARYNGITIS: Primary | ICD-10-CM

## 2020-07-20 LAB
BACTERIA SPEC RESP CULT: ABNORMAL
BACTERIA SPEC RESP CULT: ABNORMAL

## 2020-07-20 RX ORDER — AZITHROMYCIN 250 MG/1
TABLET, FILM COATED ORAL
Qty: 6 TAB | Refills: 0 | Status: SHIPPED | OUTPATIENT
Start: 2020-07-20

## 2022-08-31 LAB
ANTIBODY SCREEN, EXTERNAL: NEGATIVE
CHLAMYDIA, EXTERNAL: NEGATIVE
HBSAG, EXTERNAL: NEGATIVE
HEPATITIS C AB,   EXT: NEGATIVE
HIV, EXTERNAL: NON REACTIVE
N. GONORRHEA, EXTERNAL: NEGATIVE
RUBELLA, EXTERNAL: NORMAL
T. PALLIDUM, EXTERNAL: NON REACTIVE
TYPE, ABO & RH, EXTERNAL: NORMAL

## 2022-11-16 ENCOUNTER — OFFICE VISIT (OUTPATIENT)
Dept: URGENT CARE | Age: 27
End: 2022-11-16
Payer: COMMERCIAL

## 2022-11-16 VITALS
BODY MASS INDEX: 26.78 KG/M2 | WEIGHT: 192 LBS | SYSTOLIC BLOOD PRESSURE: 117 MMHG | DIASTOLIC BLOOD PRESSURE: 74 MMHG | OXYGEN SATURATION: 100 % | HEART RATE: 82 BPM | TEMPERATURE: 97.7 F | RESPIRATION RATE: 20 BRPM

## 2022-11-16 DIAGNOSIS — R07.0 THROAT PAIN: Primary | ICD-10-CM

## 2022-11-16 DIAGNOSIS — J02.0 STREP PHARYNGITIS: ICD-10-CM

## 2022-11-16 LAB
S PYO AG THROAT QL: NEGATIVE
VALID INTERNAL CONTROL?: YES

## 2022-11-16 RX ORDER — AZITHROMYCIN 250 MG/1
TABLET, FILM COATED ORAL
Qty: 6 TABLET | Refills: 0 | Status: SHIPPED | OUTPATIENT
Start: 2022-11-16

## 2022-11-16 NOTE — PROGRESS NOTES
Sore Throat   The history is provided by the Patient. This is a new problem. The current episode started yesterday. The problem has not changed since onset. There has been no fever. Associated symptoms include swollen glands and trouble swallowing. Pertinent negatives include no congestion, no ear pain and no plugged ear sensation. She has had no exposure to strep or mono. She has tried nothing for the symptoms. Past Medical History:   Diagnosis Date    Bronchiolitis 3/3/2010    Eczema 3/3/2010    ROM (ruptured membranes) 3/3/2010    Strep throat 3/3/2010    UTI (urinary tract infection) 3/3/2010        Past Surgical History:   Procedure Laterality Date    HX HEENT      lasik          History reviewed. No pertinent family history. Social History     Socioeconomic History    Marital status: SINGLE     Spouse name: Not on file    Number of children: Not on file    Years of education: Not on file    Highest education level: Not on file   Occupational History    Not on file   Tobacco Use    Smoking status: Never    Smokeless tobacco: Never   Substance and Sexual Activity    Alcohol use: No    Drug use: No    Sexual activity: Never   Other Topics Concern    Not on file   Social History Narrative    Not on file     Social Determinants of Health     Financial Resource Strain: Not on file   Food Insecurity: Not on file   Transportation Needs: Not on file   Physical Activity: Not on file   Stress: Not on file   Social Connections: Not on file   Intimate Partner Violence: Not on file   Housing Stability: Not on file                ALLERGIES: Amoxicillin and Griseofulvin    Review of Systems   Constitutional:  Negative for chills, fatigue and fever. HENT:  Positive for sore throat and trouble swallowing. Negative for congestion and ear pain. All other systems reviewed and are negative.     Vitals:    11/16/22 1546   BP: 117/74   Pulse: 82   Resp: 20   Temp: 97.7 °F (36.5 °C)   SpO2: 100%   Weight: 192 lb (87.1 kg)       Physical Exam  Vitals and nursing note reviewed. Constitutional:       General: She is not in acute distress. Appearance: She is not ill-appearing. HENT:      Right Ear: Tympanic membrane and ear canal normal.      Left Ear: Tympanic membrane and ear canal normal.      Nose: Nose normal.      Mouth/Throat:      Pharynx: Posterior oropharyngeal erythema and uvula swelling present. No oropharyngeal exudate. Comments: Enlarged tonsil with white patch on rt side   Eyes:      General:         Right eye: No discharge. Left eye: No discharge. Conjunctiva/sclera: Conjunctivae normal.   Pulmonary:      Effort: Pulmonary effort is normal. No respiratory distress. Breath sounds: Normal breath sounds. No decreased breath sounds, wheezing, rhonchi or rales. Musculoskeletal:      Cervical back: Neck supple. Lymphadenopathy:      Cervical: No cervical adenopathy. Skin:     Findings: No rash. MDM    Procedures      ICD-10-CM ICD-9-CM    1. Throat pain  R07.0 784.1 AMB POC RAPID STREP A      2. Strep pharyngitis  J02.0 034.0 azithromycin (Zithromax Z-Jose Cruz) 250 mg tablet          Use motrin/ tylenol/gargle     Use Z jose cruz if worsen   Medications Ordered Today   Medications    azithromycin (Zithromax Z-Jose Cruz) 250 mg tablet     Sig: Use as directed     Dispense:  6 Tablet     Refill:  0     Results for orders placed or performed in visit on 11/16/22   AMB POC RAPID STREP A   Result Value Ref Range    VALID INTERNAL CONTROL POC Yes     Group A Strep Ag Negative Negative     The patients condition was discussed with the patient and they understand. The patient is to follow up with primary care doctor. If signs and symptoms become worse the pt is to go to the ER. The patient is to take medications as prescribed.

## 2023-02-27 LAB — GRBS, EXTERNAL: NEGATIVE

## 2023-03-13 ENCOUNTER — HOSPITAL ENCOUNTER (INPATIENT)
Age: 28
LOS: 2 days | Discharge: HOME | End: 2023-03-16
Attending: STUDENT IN AN ORGANIZED HEALTH CARE EDUCATION/TRAINING PROGRAM | Admitting: OBSTETRICS & GYNECOLOGY
Payer: COMMERCIAL

## 2023-03-13 LAB
A1 MICROGLOB PLACENTAL VAG QL: NEGATIVE
BASOPHILS # BLD: 0 K/UL (ref 0–0.1)
BASOPHILS NFR BLD: 0 % (ref 0–1)
CONTROL LINE PRESENT?: NORMAL
DIFFERENTIAL METHOD BLD: ABNORMAL
EOSINOPHIL # BLD: 0.1 K/UL (ref 0–0.4)
EOSINOPHIL NFR BLD: 1 % (ref 0–7)
ERYTHROCYTE [DISTWIDTH] IN BLOOD BY AUTOMATED COUNT: 14 % (ref 11.5–14.5)
EXPIRATION DATE: NORMAL
HCT VFR BLD AUTO: 35.4 % (ref 35–47)
HGB BLD-MCNC: 12.1 G/DL (ref 11.5–16)
IMM GRANULOCYTES # BLD AUTO: 0.1 K/UL (ref 0–0.04)
IMM GRANULOCYTES NFR BLD AUTO: 1 % (ref 0–0.5)
INTERNAL NEGATIVE CONTROL: NORMAL
KIT LOT NO.: NORMAL
LYMPHOCYTES # BLD: 1.8 K/UL (ref 0.8–3.5)
LYMPHOCYTES NFR BLD: 24 % (ref 12–49)
MCH RBC QN AUTO: 31 PG (ref 26–34)
MCHC RBC AUTO-ENTMCNC: 34.2 G/DL (ref 30–36.5)
MCV RBC AUTO: 90.8 FL (ref 80–99)
MONOCYTES # BLD: 0.7 K/UL (ref 0–1)
MONOCYTES NFR BLD: 9 % (ref 5–13)
NEUTS SEG # BLD: 5.1 K/UL (ref 1.8–8)
NEUTS SEG NFR BLD: 65 % (ref 32–75)
NRBC # BLD: 0 K/UL (ref 0–0.01)
NRBC BLD-RTO: 0 PER 100 WBC
PLATELET # BLD AUTO: 211 K/UL (ref 150–400)
PMV BLD AUTO: 11.1 FL (ref 8.9–12.9)
RBC # BLD AUTO: 3.9 M/UL (ref 3.8–5.2)
WBC # BLD AUTO: 7.7 K/UL (ref 3.6–11)

## 2023-03-13 PROCEDURE — 80307 DRUG TEST PRSMV CHEM ANLYZR: CPT

## 2023-03-13 PROCEDURE — 86900 BLOOD TYPING SEROLOGIC ABO: CPT

## 2023-03-13 PROCEDURE — 75810000275 HC EMERGENCY DEPT VISIT NO LEVEL OF CARE

## 2023-03-13 PROCEDURE — 85025 COMPLETE CBC W/AUTO DIFF WBC: CPT

## 2023-03-13 PROCEDURE — 36415 COLL VENOUS BLD VENIPUNCTURE: CPT

## 2023-03-13 PROCEDURE — 84112 EVAL AMNIOTIC FLUID PROTEIN: CPT | Performed by: OBSTETRICS & GYNECOLOGY

## 2023-03-13 RX ORDER — BUTORPHANOL TARTRATE 1 MG/ML
1 INJECTION INTRAMUSCULAR; INTRAVENOUS
Status: DISCONTINUED | OUTPATIENT
Start: 2023-03-13 | End: 2023-03-16 | Stop reason: HOSPADM

## 2023-03-13 RX ORDER — SODIUM CHLORIDE, SODIUM LACTATE, POTASSIUM CHLORIDE, CALCIUM CHLORIDE 600; 310; 30; 20 MG/100ML; MG/100ML; MG/100ML; MG/100ML
125 INJECTION, SOLUTION INTRAVENOUS CONTINUOUS
Status: DISCONTINUED | OUTPATIENT
Start: 2023-03-14 | End: 2023-03-16

## 2023-03-14 ENCOUNTER — ANESTHESIA (OUTPATIENT)
Dept: LABOR AND DELIVERY | Age: 28
End: 2023-03-14
Payer: COMMERCIAL

## 2023-03-14 ENCOUNTER — ANESTHESIA EVENT (OUTPATIENT)
Dept: LABOR AND DELIVERY | Age: 28
End: 2023-03-14
Payer: COMMERCIAL

## 2023-03-14 PROBLEM — O47.9 IRREGULAR LABOR: Status: ACTIVE | Noted: 2023-03-14

## 2023-03-14 LAB
ABO + RH BLD: NORMAL
AMPHET UR QL SCN: NEGATIVE
BARBITURATES UR QL SCN: NEGATIVE
BENZODIAZ UR QL: NEGATIVE
BLOOD GROUP ANTIBODIES SERPL: NORMAL
CANNABINOIDS UR QL SCN: NEGATIVE
COCAINE UR QL SCN: NEGATIVE
DRUG SCRN COMMENT,DRGCM: NORMAL
METHADONE UR QL: NEGATIVE
OPIATES UR QL: NEGATIVE
PCP UR QL: NEGATIVE
SPECIMEN EXP DATE BLD: NORMAL

## 2023-03-14 PROCEDURE — 4A1HXCZ MONITORING OF PRODUCTS OF CONCEPTION, CARDIAC RATE, EXTERNAL APPROACH: ICD-10-PCS | Performed by: STUDENT IN AN ORGANIZED HEALTH CARE EDUCATION/TRAINING PROGRAM

## 2023-03-14 PROCEDURE — 74011250636 HC RX REV CODE- 250/636

## 2023-03-14 PROCEDURE — 74011000250 HC RX REV CODE- 250: Performed by: ANESTHESIOLOGY

## 2023-03-14 PROCEDURE — 76060000078 HC EPIDURAL ANESTHESIA

## 2023-03-14 PROCEDURE — 77030014125 HC TY EPDRL BBMI -B: Performed by: ANESTHESIOLOGY

## 2023-03-14 PROCEDURE — 75410000000 HC DELIVERY VAGINAL/SINGLE

## 2023-03-14 PROCEDURE — 0KQM0ZZ REPAIR PERINEUM MUSCLE, OPEN APPROACH: ICD-10-PCS | Performed by: STUDENT IN AN ORGANIZED HEALTH CARE EDUCATION/TRAINING PROGRAM

## 2023-03-14 PROCEDURE — 65410000002 HC RM PRIVATE OB

## 2023-03-14 PROCEDURE — 75410000002 HC LABOR FEE PER 1 HR

## 2023-03-14 PROCEDURE — 74011000250 HC RX REV CODE- 250

## 2023-03-14 PROCEDURE — 36415 COLL VENOUS BLD VENIPUNCTURE: CPT

## 2023-03-14 PROCEDURE — 74011250637 HC RX REV CODE- 250/637: Performed by: STUDENT IN AN ORGANIZED HEALTH CARE EDUCATION/TRAINING PROGRAM

## 2023-03-14 PROCEDURE — 74011250636 HC RX REV CODE- 250/636: Performed by: OBSTETRICS & GYNECOLOGY

## 2023-03-14 PROCEDURE — 75410000003 HC RECOV DEL/VAG/CSECN EA 0.5 HR

## 2023-03-14 RX ORDER — HYDROCORTISONE ACETATE PRAMOXINE HCL 2.5; 1 G/100G; G/100G
CREAM TOPICAL AS NEEDED
Status: DISCONTINUED | OUTPATIENT
Start: 2023-03-14 | End: 2023-03-16 | Stop reason: HOSPADM

## 2023-03-14 RX ORDER — SODIUM CHLORIDE 0.9 % (FLUSH) 0.9 %
5-40 SYRINGE (ML) INJECTION EVERY 8 HOURS
Status: DISCONTINUED | OUTPATIENT
Start: 2023-03-14 | End: 2023-03-14

## 2023-03-14 RX ORDER — BUPIVACAINE HYDROCHLORIDE AND EPINEPHRINE 2.5; 5 MG/ML; UG/ML
INJECTION, SOLUTION EPIDURAL; INFILTRATION; INTRACAUDAL; PERINEURAL AS NEEDED
Status: DISCONTINUED | OUTPATIENT
Start: 2023-03-14 | End: 2023-03-14 | Stop reason: HOSPADM

## 2023-03-14 RX ORDER — OXYTOCIN/RINGER'S LACTATE 30/500 ML
1-25 PLASTIC BAG, INJECTION (ML) INTRAVENOUS
Status: DISCONTINUED | OUTPATIENT
Start: 2023-03-14 | End: 2023-03-16

## 2023-03-14 RX ORDER — ACETAMINOPHEN 325 MG/1
650 TABLET ORAL
Status: DISCONTINUED | OUTPATIENT
Start: 2023-03-14 | End: 2023-03-16 | Stop reason: HOSPADM

## 2023-03-14 RX ORDER — OXYTOCIN/RINGER'S LACTATE 30/500 ML
PLASTIC BAG, INJECTION (ML) INTRAVENOUS
Status: COMPLETED
Start: 2023-03-14 | End: 2023-03-14

## 2023-03-14 RX ORDER — EPHEDRINE SULFATE/0.9% NACL/PF 50 MG/5 ML
12.5 SYRINGE (ML) INTRAVENOUS
Status: COMPLETED | OUTPATIENT
Start: 2023-03-14 | End: 2023-03-14

## 2023-03-14 RX ORDER — OXYCODONE HYDROCHLORIDE 5 MG/1
5 TABLET ORAL
Status: DISCONTINUED | OUTPATIENT
Start: 2023-03-14 | End: 2023-03-16 | Stop reason: HOSPADM

## 2023-03-14 RX ORDER — EPHEDRINE SULFATE/0.9% NACL/PF 50 MG/5 ML
12.5 SYRINGE (ML) INTRAVENOUS ONCE
Status: COMPLETED | OUTPATIENT
Start: 2023-03-14 | End: 2023-03-14

## 2023-03-14 RX ORDER — DIPHENHYDRAMINE HCL 25 MG
25 CAPSULE ORAL
Status: DISCONTINUED | OUTPATIENT
Start: 2023-03-14 | End: 2023-03-16 | Stop reason: HOSPADM

## 2023-03-14 RX ORDER — IBUPROFEN 400 MG/1
800 TABLET ORAL EVERY 8 HOURS
Status: DISCONTINUED | OUTPATIENT
Start: 2023-03-14 | End: 2023-03-16 | Stop reason: HOSPADM

## 2023-03-14 RX ORDER — OXYTOCIN/RINGER'S LACTATE 30/500 ML
87.3 PLASTIC BAG, INJECTION (ML) INTRAVENOUS AS NEEDED
Status: DISCONTINUED | OUTPATIENT
Start: 2023-03-14 | End: 2023-03-16 | Stop reason: HOSPADM

## 2023-03-14 RX ORDER — ONDANSETRON 4 MG/1
4 TABLET, ORALLY DISINTEGRATING ORAL
Status: ACTIVE | OUTPATIENT
Start: 2023-03-14 | End: 2023-03-15

## 2023-03-14 RX ORDER — NALOXONE HYDROCHLORIDE 0.4 MG/ML
0.4 INJECTION, SOLUTION INTRAMUSCULAR; INTRAVENOUS; SUBCUTANEOUS AS NEEDED
Status: DISCONTINUED | OUTPATIENT
Start: 2023-03-14 | End: 2023-03-14

## 2023-03-14 RX ORDER — BUPIVACAINE HYDROCHLORIDE AND EPINEPHRINE 2.5; 5 MG/ML; UG/ML
INJECTION, SOLUTION EPIDURAL; INFILTRATION; INTRACAUDAL; PERINEURAL
Status: COMPLETED
Start: 2023-03-14 | End: 2023-03-14

## 2023-03-14 RX ORDER — SODIUM CHLORIDE 0.9 % (FLUSH) 0.9 %
5-40 SYRINGE (ML) INJECTION AS NEEDED
Status: DISCONTINUED | OUTPATIENT
Start: 2023-03-14 | End: 2023-03-14

## 2023-03-14 RX ORDER — OXYTOCIN/RINGER'S LACTATE 30/500 ML
10 PLASTIC BAG, INJECTION (ML) INTRAVENOUS AS NEEDED
Status: COMPLETED | OUTPATIENT
Start: 2023-03-14 | End: 2023-03-14

## 2023-03-14 RX ORDER — SODIUM CHLORIDE, SODIUM LACTATE, POTASSIUM CHLORIDE, CALCIUM CHLORIDE 600; 310; 30; 20 MG/100ML; MG/100ML; MG/100ML; MG/100ML
125 INJECTION, SOLUTION INTRAVENOUS CONTINUOUS
Status: DISCONTINUED | OUTPATIENT
Start: 2023-03-14 | End: 2023-03-16

## 2023-03-14 RX ORDER — NALOXONE HYDROCHLORIDE 0.4 MG/ML
0.4 INJECTION, SOLUTION INTRAMUSCULAR; INTRAVENOUS; SUBCUTANEOUS AS NEEDED
Status: DISCONTINUED | OUTPATIENT
Start: 2023-03-14 | End: 2023-03-16 | Stop reason: HOSPADM

## 2023-03-14 RX ORDER — OXYTOCIN/RINGER'S LACTATE 30/500 ML
1-25 PLASTIC BAG, INJECTION (ML) INTRAVENOUS
Status: DISCONTINUED | OUTPATIENT
Start: 2023-03-14 | End: 2023-03-14

## 2023-03-14 RX ORDER — OXYTOCIN/RINGER'S LACTATE 30/500 ML
87.3 PLASTIC BAG, INJECTION (ML) INTRAVENOUS AS NEEDED
Status: COMPLETED | OUTPATIENT
Start: 2023-03-14 | End: 2023-03-14

## 2023-03-14 RX ORDER — TERBUTALINE SULFATE 1 MG/ML
0.25 INJECTION SUBCUTANEOUS ONCE
Status: COMPLETED | OUTPATIENT
Start: 2023-03-14 | End: 2023-03-14

## 2023-03-14 RX ORDER — ZOLPIDEM TARTRATE 5 MG/1
5 TABLET ORAL
Status: DISCONTINUED | OUTPATIENT
Start: 2023-03-14 | End: 2023-03-16 | Stop reason: HOSPADM

## 2023-03-14 RX ORDER — FENTANYL/BUPIVACAINE/NS/PF 2-1250MCG
SYRINGE (ML) EPIDURAL
Status: COMPLETED
Start: 2023-03-14 | End: 2023-03-14

## 2023-03-14 RX ORDER — SODIUM CHLORIDE 0.9 % (FLUSH) 0.9 %
5-40 SYRINGE (ML) INJECTION AS NEEDED
Status: DISCONTINUED | OUTPATIENT
Start: 2023-03-14 | End: 2023-03-16 | Stop reason: HOSPADM

## 2023-03-14 RX ORDER — FENTANYL/BUPIVACAINE/NS/PF 2-1250MCG
12 SYRINGE (ML) EPIDURAL CONTINUOUS
Status: DISCONTINUED | OUTPATIENT
Start: 2023-03-14 | End: 2023-03-15 | Stop reason: HOSPADM

## 2023-03-14 RX ORDER — DOCUSATE SODIUM 100 MG/1
100 CAPSULE, LIQUID FILLED ORAL
Status: DISCONTINUED | OUTPATIENT
Start: 2023-03-14 | End: 2023-03-16 | Stop reason: HOSPADM

## 2023-03-14 RX ORDER — OXYTOCIN/RINGER'S LACTATE 30/500 ML
10 PLASTIC BAG, INJECTION (ML) INTRAVENOUS AS NEEDED
Status: DISCONTINUED | OUTPATIENT
Start: 2023-03-14 | End: 2023-03-16 | Stop reason: HOSPADM

## 2023-03-14 RX ADMIN — Medication 12.5 MG: at 07:38

## 2023-03-14 RX ADMIN — Medication 1 MILLI-UNITS/MIN: at 05:45

## 2023-03-14 RX ADMIN — OXYTOCIN 10000 MILLI-UNITS: 10 INJECTION INTRAVENOUS at 10:54

## 2023-03-14 RX ADMIN — IBUPROFEN 800 MG: 400 TABLET ORAL at 12:57

## 2023-03-14 RX ADMIN — OXYTOCIN 1 MILLI-UNITS/MIN: 10 INJECTION INTRAVENOUS at 05:45

## 2023-03-14 RX ADMIN — SODIUM CHLORIDE, POTASSIUM CHLORIDE, SODIUM LACTATE AND CALCIUM CHLORIDE 125 ML/HR: 600; 310; 30; 20 INJECTION, SOLUTION INTRAVENOUS at 03:36

## 2023-03-14 RX ADMIN — SODIUM CHLORIDE, POTASSIUM CHLORIDE, SODIUM LACTATE AND CALCIUM CHLORIDE 999 ML/HR: 600; 310; 30; 20 INJECTION, SOLUTION INTRAVENOUS at 02:29

## 2023-03-14 RX ADMIN — Medication 12.5 MG: at 08:07

## 2023-03-14 RX ADMIN — BUPIVACAINE HYDROCHLORIDE AND EPINEPHRINE 0.4 ML: 2.5; 5 INJECTION, SOLUTION EPIDURAL; INFILTRATION; INTRACAUDAL; PERINEURAL at 03:59

## 2023-03-14 RX ADMIN — OXYTOCIN 87.3 MILLI-UNITS/MIN: 10 INJECTION INTRAVENOUS at 11:05

## 2023-03-14 RX ADMIN — TERBUTALINE SULFATE 0.25 MG: 1 INJECTION, SOLUTION SUBCUTANEOUS at 04:28

## 2023-03-14 RX ADMIN — IBUPROFEN 800 MG: 400 TABLET ORAL at 20:53

## 2023-03-14 RX ADMIN — ACETAMINOPHEN 650 MG: 325 TABLET ORAL at 21:37

## 2023-03-14 RX ADMIN — Medication 12 ML/HR: at 04:07

## 2023-03-14 RX ADMIN — SODIUM CHLORIDE, POTASSIUM CHLORIDE, SODIUM LACTATE AND CALCIUM CHLORIDE 125 ML/HR: 600; 310; 30; 20 INJECTION, SOLUTION INTRAVENOUS at 07:37

## 2023-03-14 RX ADMIN — BUPIVACAINE HYDROCHLORIDE AND EPINEPHRINE 6 ML: 2.5; 5 INJECTION, SOLUTION EPIDURAL; INFILTRATION; INTRACAUDAL; PERINEURAL at 04:00

## 2023-03-14 NOTE — H&P
History & Physical    Name: Kristel Licona MRN: [id number]  SSN: [id number]    YOB: 1995  Age: 25 y.o. Sex: female        Subjective:   CC: Clear ROM at 2200 hrs and contractions. Estimated Date of Delivery: 3/21/23  OB History    Para Term  AB Living   2       1     SAB IAB Ectopic Molar Multiple Live Births   1                # Outcome Date GA Lbr Freddy/2nd Weight Sex Delivery Anes PTL Lv   2 Current            1 SAB 22               Ms. Harvey Gonzalez is admitted with pregnancy at 39w0d for  clear ROM at 2200 hrs and regular contractions every 5 minutes. No vaginal bleeding or decreased FM . Prenatal course was complicated by  anxiety . Please see [phone] [facility] prenatal records from 2022 through to 3/13/23 for details. Past Medical History:   Diagnosis Date    Abnormal Papanicolaou smear of cervix     Anemia     Bronchiolitis 2010    Eczema 2010    ROM (ruptured membranes) 2010    Strep throat 2010    UTI (urinary tract infection) 2010     Past Surgical History:   Procedure Laterality Date     NISREEN chan      Social History     Occupational History    Not on file   Tobacco Use    Smoking status: Never    Smokeless tobacco: Never   Vaping Use    Vaping Use: Never used   Substance and Sexual Activity    Alcohol use: Not Currently    Drug use: No    Sexual activity: Never     Family History   Problem Relation Age of Onset    Diabetes Mother     Heart Disease Father     Hypertension Father      Allergies   Allergen Reactions    Amoxicillin Other (comments)     Unknown      Griseofulvin Rash    Penicillins Hives     Prior to Admission medications    Medication Sig Start Date End Date Taking? Authorizing Provider   azithromycin (Zithromax Z-Jose Cruz) 250 mg tablet Use as directed  Patient not taking: Reported on 3/13/2023 11/16/22   Christel Regalado MD        Review of Systems   Constitutional:  Negative for chills and fever.    HENT:  Negative for mouth sores.    Eyes:  Negative for photophobia and visual disturbance. Respiratory:  Negative for cough, shortness of breath and wheezing. Cardiovascular:  Negative for chest pain, palpitations and leg swelling. Gastrointestinal:  Negative for diarrhea, nausea and vomiting. Endocrine: Negative for cold intolerance and heat intolerance. Genitourinary:  Negative for dysuria, genital sores, hematuria and vaginal bleeding. Musculoskeletal:  Negative for arthralgias, back pain and myalgias. Skin:  Negative for rash. Neurological:  Negative for dizziness and headaches. Hematological:  Negative for adenopathy. Psychiatric/Behavioral:  Negative for agitation, behavioral problems, confusion and decreased concentration. Objective:     Vitals:  Vitals:    03/13/23 2303 03/13/23 2306 03/14/23 0121 03/14/23 0125   BP:  123/82 133/83    Pulse:  (!) 114 88    Resp:  18     Temp:  98.4 °F (36.9 °C) 98.4 °F (36.9 °C)    SpO2:  98%  99%   Weight: 100.7 kg (222 lb)      Height: 5' 11\" (1.803 m)           Physical Exam  Vitals and nursing note reviewed. Exam conducted with a chaperone present. Constitutional:       General: She is not in acute distress. Appearance: Normal appearance. She is normal weight. She is not ill-appearing, toxic-appearing or diaphoretic. HENT:      Head: Normocephalic and atraumatic. Right Ear: External ear normal.      Left Ear: External ear normal.   Eyes:      General: No scleral icterus. Extraocular Movements: Extraocular movements intact. Cardiovascular:      Rate and Rhythm: Normal rate and regular rhythm. Pulses: Normal pulses. Heart sounds: Normal heart sounds. No murmur heard. No friction rub. No gallop. Pulmonary:      Effort: Pulmonary effort is normal. No respiratory distress. Breath sounds: Normal breath sounds. No stridor. No wheezing, rhonchi or rales. Abdominal:      General: Bowel sounds are normal. There is no distension. Palpations: Abdomen is soft. There is no mass. Tenderness: There is no abdominal tenderness. There is no right CVA tenderness, left CVA tenderness, guarding or rebound. Hernia: No hernia is present. Musculoskeletal:         General: No swelling, tenderness, deformity or signs of injury. Cervical back: Normal range of motion and neck supple. No rigidity or tenderness. Right lower leg: No edema. Left lower leg: No edema. Lymphadenopathy:      Cervical: No cervical adenopathy. Skin:     General: Skin is warm and dry. Capillary Refill: Capillary refill takes less than 2 seconds. Coloration: Skin is not jaundiced or pale. Findings: No bruising, erythema, lesion or rash. Neurological:      Mental Status: She is alert and oriented to person, place, and time. Psychiatric:         Mood and Affect: Mood normal.         Behavior: Behavior normal.         Thought Content: Thought content normal.         Judgment: Judgment normal.       Cervical Exam:  by RN  Uterine Activity: Frequency: Every 5 minutes   Membranes: Spontaneous Rupture of Membranes; Amniotic Fluid: clear fluid  Fetal Heart Rate: Reactive     Vtx/OP by U/S    Prenatal Labs:   Lab Results   Component Value Date/Time    Rubella, External immune 2022 12:00 AM    GrBStrep, External negative 2023 12:00 AM    HBsAg, External negative 2022 12:00 AM    HIV, External non reactive 2022 12:00 AM    Gonorrhea, External negative 2022 12:00 AM    Chlamydia, External negative 2022 12:00 AM    ABO,Rh A positive 2022 12:00 AM          Impression/Plan:     1) IUP at 44 w 0 d with SROM and early labor     Plan: Admit for labor. Group B Strep was negative. Anticipate .

## 2023-03-14 NOTE — L&D DELIVERY NOTE
Delivery Summary  Patient: Bryn Jessica             Circumcision:   desires  Additional Delivery Comments -   Patient presented with SROM at 2200. Augmented with pitocin. Appropriate progress over approximately 12 hours. Called into room as patient was feeling increased pressure. She pushed for approximately 15 minutes before delivery of the infants head in the BILL position. There was a nuchal cord present which was reduced. With gentle downward traction the anterior shoulder delivered without difficulty followed by the posterior shoulder and the body. The infant was immediately placed on the maternal abdomen with spontaneous cry after vigorous stimulation by RN. The cord was doubly clamped after cessation of pulsation and cut by the patient's mother. Gentle downward traction was placed on the umbilical cord with delivery of the placenta within less than 5 minutes. The fundus was then massaged and was firm. Inspection of the perineum revealed a second degree laceration which was repaired with a 3-0 vicryl in a standard running fashion. Hemostasis was excellent. Information for the patient's : Drea Shaw [[id number]]     Delivery Type:     Delivery Date: 3/14/2023   Delivery Time: 10:49 AM     Birth Weight: 3.245 kg     Sex:  male  Delivery Clinician:      Gestational Age: 39w0d    Presentation:     Position:             Apgars were 8  and 9      Resuscitation Method: Suctioning-bulb; Tactile Stimulation     Meconium Stained: None    Living Status: Living       Placenta Date/Time:     Placenta Removal:     Placenta Appearance:      Cord Information:      Cord Events:         Disposition of Cord Blood:      Blood Gases Sent?:        Cord pH:  none    Episiotomy:    Laceration(s):      Estimated Blood Loss (ml): No data found    Labor Events  Method:       Augmentation:    Cervical Ripening:             Operative Vaginal Delivery - none    Melissa Becerra MD

## 2023-03-14 NOTE — PROGRESS NOTES
Problem: Vaginal Delivery: Day of Deliver-Laboring  Goal: Activity/Safety  Outcome: Progressing Towards Goal  Goal: Diagnostic Test/Procedures  Outcome: Progressing Towards Goal  Goal: Nutrition/Diet  Outcome: Progressing Towards Goal  Goal: Medications  Outcome: Progressing Towards Goal  Goal: Respiratory  Outcome: Progressing Towards Goal  Goal: *Vital signs within defined limits  Outcome: Progressing Towards Goal  Goal: *Labs within defined limits  Outcome: Progressing Towards Goal  Goal: *Hemodynamically stable  Outcome: Progressing Towards Goal  Goal: *Optimal pain control at patient's stated goal  Outcome: Progressing Towards Goal

## 2023-03-14 NOTE — PROGRESS NOTES
2241:  Per Sandoval is a 25 y.o.  at 39w0d patient of Dr Cheyenne Goodman at [facility] who presents to L&D triage with c/o rupture of membranes . She reports Positive FM, denies vaginal bleeding. She also denies Headaches, Scotoma, RUQ pain, and Edema. Urine sample obtained. EFM and toco placed for initial assessment. 0145: Dr. Dozier  at bedside for evaluation. Ultrasound performed by MD     [id number]: Anesthesia at bedside at 0350. Patient positioned to side of the bed, EFM & TOCO adjusted to accommodate sterile field. Difficulty tracing due to maternal position. Time out completed at 0351. Successful epidural is completed by Dr Debra Gonsales. Patient is repositioned supine in bed with wedge under left hip. EFM and TOCO replaced and blood pressure frequency increased. Wagner catheter placed and epidural continuous infusion is started. [id number]: Dr. Dozier  at bedside for evaluation. IUPC placed, SVE performed, and terb given during this time frame    0542: Verbal orders given to start pit     0709: Bedside and Verbal shift change report given to RONA Hernández RN (oncoming nurse) by SUKI Willett RN (offgoing nurse). Report included the following information SBAR and Kardex.

## 2023-03-14 NOTE — PROGRESS NOTES
Cx 3-4/100/-1 st.IUPC placed easily with clear fluid and good waveform. During two contractions , patient had bradycardia with attempt at return to baseline over 4 minutes. Patient responded to IVF bolus,position change, O2 by FM, and SQ Terbutaline. CHARLA= 8.5. Tracing now with baseline of 140 with good accels and no decels. Will observe and await return of contractions. If tracing continues to be Cat1 or occasionaly 2 and inadequate MVU, will augment with Pitocin.

## 2023-03-14 NOTE — PROGRESS NOTES
NST:    Baseline: 130    Variability: Moderate    Accelerations: Two 15 x 15 accels in a ten minute window    Decelerations: None    Contractions: Irregular    RNST, Cat 1    This test was preformed under my supervision and interpreted by me.     Kendra Quintana MD

## 2023-03-14 NOTE — PROGRESS NOTES
3531:  Bedside and Verbal shift change report given to RONA Chapman RN (oncoming nurse) by SUKI Willett RN (offgoing nurse). Report included the following information SBAR, Intake/Output, MAR, and Recent Results. 1035:  Patient actively pushing. RN remains in continuous attendance at the bedside. Assessment & evaluation of fetal heart rate ongoing via continuous EFM. 1049:  RN remained at bedside throughout pushing. EFM continuously assessed. Vaginal delivery of viable infant by Dr. Quique Luciano. 1255: Bedside and Verbal shift change report given to TRACY Mishra RN (oncoming nurse) by RONA Chapman RN (offgoing nurse). Report included the following information SBAR, Kardex, Intake/Output, MAR, and Recent Results.

## 2023-03-14 NOTE — ANESTHESIA PREPROCEDURE EVALUATION
Relevant Problems   No relevant active problems       Anesthetic History   No history of anesthetic complications            Review of Systems / Medical History  Patient summary reviewed, nursing notes reviewed and pertinent labs reviewed    Pulmonary  Within defined limits                 Neuro/Psych   Within defined limits           Cardiovascular  Within defined limits                Exercise tolerance: >4 METS     GI/Hepatic/Renal  Within defined limits              Endo/Other        Obesity     Other Findings   Comments: IUP  Eczema            Physical Exam    Airway  Mallampati: II    Neck ROM: normal range of motion   Mouth opening: Normal     Cardiovascular  Regular rate and rhythm,  S1 and S2 normal,  no murmur, click, rub, or gallop             Dental         Pulmonary  Breath sounds clear to auscultation               Abdominal  GI exam deferred       Other Findings            Anesthetic Plan    ASA: 2  Anesthesia type: CSE            Anesthetic plan and risks discussed with: Patient

## 2023-03-14 NOTE — PROGRESS NOTES
1600 - Pt ambulated to the bathroom x 2 assist with no complications and no complaint of dizziness. Pt ambulated ad jasmin back to bed after sarah care and self care with linens and changed    Bedside and Verbal shift change report given to SUKI Ndiaye RN and Sophia Garcia RN (oncoming nurse) by Froilan Yepez RN (offgoing nurse). Report included the following information SBAR, Kardex, Intake/Output, MAR, and Recent Results.

## 2023-03-14 NOTE — ED PROVIDER NOTES
Brief Medical Screening Examination for OB patient at triage    HPI:29 yo female 44 weeks pregnant complains of lower abdominal pain. Patient states labor like pain every 5 minutes. Patient states that she believes that she lost her water earlier this evening. She denies any recent illness to include fever chills. She denies any cough cold symptoms. She denies any severe headache, chest pain, shortness of breath, patient is followed by Dr. Shruthi Rainey    ROS: now      Vitals: Patient Vitals for the past 4 hrs:   Temp Pulse Resp BP SpO2   03/13/23 2240 98.4 °F (36.9 °C) (!) 107 16 (!) 134/91 99 %         Physical Exam:  General appearance: No apparent distress. Stable vitals. Afebrile. Skin exam: Warm and dry. No pallor. Neurologic: Alert and oriented. Abdominal exam: Soft, nontender. Gravid uterus. Differential diagnosis: Labor, premature labor, threatened miscarriage, Juan-Torres contractions, uterine contractions in pregnancy, premature rupture of membranes. This patient with a primary obstetrical chief complaint is stable and medically cleared for direct transfer to the [facility] Labor [facility] Delivery unit for further monitoring and workup. Medical screening exam is complete.     Merline White DO

## 2023-03-14 NOTE — ANESTHESIA PROCEDURE NOTES
CSE Block    Performed by: Lopez Gutierrez MD  Authorized by: Lopez Gutierrez MD     Pre-Procedure  preanesthetic checklist: risks and benefits discussed, site marked and timeout performed      Procedure:   Patient Position:  Seated  Prep Region:  Lumbar  Prep: DuraPrep    Location:  L2-3    Epidural Needle:   Needle Type:  Tuohy  Needle Gauge:  17 G  Injection Technique:  Loss of resistance using air  Attempts:  1    Spinal Needle:   Needle Type: Marielena  Needle Gauge:  25 G    Catheter:   Catheter Type:  Flex-tip  Catheter Size:  19 G  Events: no blood with aspiration, no paresthesia, negative aspiration test and CSF confirmed    Med Admin time: 3/14/2023 3:59 AM    Assessment:   Catheter Secured:  Tegaderm and tape  Insertion:  Uncomplicated  Patient tolerance:  Patient tolerated the procedure well with no immediate complications  Hands washed and mask worn during procedure. Spinal portion:    A 25 g pencil point spinal needle was placed through the Touhy x1 attempt until CSF was obtained. 0.4 mL 0.25% bupivacaine with 1:200K epinephrine was deposited into the CSF. -paresthesia.

## 2023-03-14 NOTE — LACTATION NOTE
This note was copied from a baby's chart.     23 1410   Visit Information   Lactation Consult Visit Type IP Initial Consult   Visit Length 30 minutes   Reason for Visit Normal  Visit;Education   Breast- Feeding Assessment   Breast-Feeding Experience Yes  Equipment: [employer] Duo/Ekta breast pump; Measured for 19-21mm flanges   Breast Assessment   Left Breast Large   Left Nipple Everted; Short   Right Breast Large   Right Nipple Everted; Short   Mother/Infant Observation   Mother Observation Breast comfortable;Close hold;Cramps; Nipple round on release;Recognizes feeding cues   Infant Observation Breast tissue moves; Feeding cues; Frenulum checked; Latches nipple and aereolae;Lips flanged, lower; Lips flanged, upper;Opens mouth  (Chin slightly recessed)   LATCH Documentation   Latch 2   Audible Swallowing 1   Type of Nipple 2   Comfort (Breast/Nipple) 2   Hold (Positioning) 0   LATCH Score 7     Reviewed the \"Your Guide to Breastfeeding\" booklet. Discussed the typical feeding characteristics in the 1st and 2nd DOL and signs of adequate intake. Demonstrated hand expression and the asymmetric latch. Mother's nipples are shorter, however baby latched well and observed baby showing good signs of transfer on the breast. Discussed a feeding plan and mother's questions were addressed. Plan:  Offer lots of skin to skin and access to the breast.  Feed baby at early signs of hunger every 2-2 1/2  hours. Assure a deep latch, check that baby's lips are turned outward and use breast compression to keep baby actively feeding. Pump/hand express for poor feeds and offer baby EBM. Monitor wet and dirty diapers for signs of adequate intake.

## 2023-03-15 PROCEDURE — 65410000002 HC RM PRIVATE OB

## 2023-03-15 PROCEDURE — 74011250637 HC RX REV CODE- 250/637: Performed by: STUDENT IN AN ORGANIZED HEALTH CARE EDUCATION/TRAINING PROGRAM

## 2023-03-15 RX ADMIN — IBUPROFEN 800 MG: 400 TABLET ORAL at 20:13

## 2023-03-15 RX ADMIN — IBUPROFEN 800 MG: 400 TABLET ORAL at 04:35

## 2023-03-15 RX ADMIN — IBUPROFEN 800 MG: 400 TABLET ORAL at 12:48

## 2023-03-15 NOTE — PROGRESS NOTES
Post-Partum Day Number 1 Progress Note    Maribell Dyer     Assessment: Doing well, post partum day 1    Plan:  - Continue routine postpartum and perineal care as well as maternal education.  - The risks and benefits of the circumcision  procedure and anesthesia including: bleeding, infection, variability of cosmetic results were discussed at length with the mother. She is aware that future repeat procedures may be necessary. She gives informed consent to proceed as noted and her questions are answered. - Plan discharge home [address] Discharge Date: Tomorrow. Information for the patient's : Elisa Salts [[id number]]   Vaginal, Spontaneous  Patient doing well without significant complaint. Voiding without difficulty, normal lochia. Vitals:  Visit Vitals  /76 (BP 1 Location: Right upper arm, BP Patient Position: At rest)   Pulse 94   Temp 98.3 °F (36.8 °C)   Resp 17   Ht 5' 11\" (1.803 m)   Wt 100.7 kg (222 lb)   SpO2 100%   Breastfeeding Unknown   BMI 30.96 kg/m²     Temp (24hrs), Av.3 °F (36.8 °C), Min:98.1 °F (36.7 °C), Max:98.4 °F (36.9 °C)        Exam:   Patient without distress. Fundus firm, nontender per nursing fundal checks. Perineum with normal lochia noted per nursing assessment. Lower extremities are negative for pathological edema. Labs:     Lab Results   Component Value Date/Time    WBC 7.7 2023 11:34 PM    HGB 12.1 2023 11:34 PM    HCT 35.4 2023 11:34 PM    PLATELET 681  11:34 PM       No results found for this or any previous visit (from the past 24 hour(s)).

## 2023-03-15 NOTE — ROUTINE PROCESS
Bedside and Verbal shift change report given to SUKI Atkinson, RN and Rashad Vaughan, RN (oncoming nurse) by Allen Ye. Nicolas Baig RN (offgoing nurse). Report included the following information SBAR, Kardex, Intake/Output, MAR, and Recent Results.

## 2023-03-15 NOTE — ROUTINE PROCESS
Bedside shift change report given to TRACY Blackmon (oncoming nurse) by RONA Mendiola, [address] [place] [address]. JOHN PAUL Rodriguez (offgoing nurse). Report included the following information SBAR, Intake/Output, and MAR.

## 2023-03-16 VITALS
BODY MASS INDEX: 31.08 KG/M2 | WEIGHT: 222 LBS | RESPIRATION RATE: 16 BRPM | HEART RATE: 94 BPM | TEMPERATURE: 98.1 F | SYSTOLIC BLOOD PRESSURE: 133 MMHG | OXYGEN SATURATION: 100 % | DIASTOLIC BLOOD PRESSURE: 88 MMHG | HEIGHT: 71 IN

## 2023-03-16 PROCEDURE — 74011250637 HC RX REV CODE- 250/637: Performed by: STUDENT IN AN ORGANIZED HEALTH CARE EDUCATION/TRAINING PROGRAM

## 2023-03-16 RX ADMIN — ACETAMINOPHEN 650 MG: 325 TABLET ORAL at 07:47

## 2023-03-16 RX ADMIN — IBUPROFEN 800 MG: 400 TABLET ORAL at 04:23

## 2023-03-16 NOTE — PROGRESS NOTES
Post-Partum Day Number 2 Progress Note    Tom Rees     Assessment: Doing well, post partum day 2    Plan:   - Discharge home today  - Follow up in office in 6 week(s) with [facility].  - Pain medication prescription(s) sent. - Questions answered. Information for the patient's : Janeth Zhang [[id number]]   Vaginal, Spontaneous  Patient doing well without significant complaint. Voiding without difficulty, normal lochia. Ready for discharge home. Vitals:  Visit Vitals  /88 (BP 1 Location: Right upper arm, BP Patient Position: At rest)   Pulse 94   Temp 98.1 °F (36.7 °C)   Resp 16   Ht 5' 11\" (1.803 m)   Wt 100.7 kg (222 lb)   SpO2 100%   Breastfeeding Unknown   BMI 30.96 kg/m²     Temp (24hrs), Av.3 °F (36.8 °C), Min:98 °F (36.7 °C), Max:98.8 °F (37.1 °C)      Exam:        Patient without distress. Fundus firm, nontender per nursing fundal checks                Perineum with normal lochia noted per nursing assessment                Lower extremities are negative for pathological edema    Labs:     Lab Results   Component Value Date/Time    WBC 7.7 2023 11:34 PM    HGB 12.1 2023 11:34 PM    HCT 35.4 2023 11:34 PM    PLATELET 836  11:34 PM       No results found for this or any previous visit (from the past 24 hour(s)).

## 2023-03-16 NOTE — DISCHARGE SUMMARY
Obstetrical Discharge Summary     Name: Tom Rees MRN: [id number]  SSN: [id number]    YOB: 1995  Age: 25 y.o. Sex: female      Admit Date: 3/13/2023    Discharge Date: 3/16/2023     Admitting Physician: Jessy Farrell MD     Attending Physician:  Eyal Chiang MD     Admission Diagnoses: Irregular labor [O47.9]    Discharge Diagnoses:   Information for the patient's : Janeth Zhang [[id number]]   Delivery of a 3.245 kg male infant via Vaginal, Spontaneous on 3/14/2023 at 10:49 AM  by Wesley Lincoln. Apgars were 8  and 9 . Additional Diagnoses:   Hospital Problems  Date Reviewed: 3/14/2023            Codes Class Noted POA    Irregular labor ICD-10-CM: O47.9  ICD-9-CM: 644.10  3/14/2023 Unknown          Lab Results   Component Value Date/Time    Rubella, External immune 2022 12:00 AM    GrBStrep, External negative 2023 12:00 AM       Hospital Course: Normal hospital course following the delivery. Patient Instructions:   Current Discharge Medication List        CONTINUE these medications which have NOT CHANGED    Details   azithromycin (Zithromax Z-Jose Cruz) 250 mg tablet Use as directed  Qty: 6 Tablet, Refills: 0    Associated Diagnoses: Strep pharyngitis             Disposition at Discharge: Home or self care    Condition at Discharge: Stable    Reference my discharge instructions.     Follow-up Appointments   Procedures    FOLLOW UP VISIT Appointment in: 6 Weeks With Dr Lisa Ramirez for postpartum check     With Dr Lisa Ramirez for postpartum check     Standing Status:   Standing     Number of Occurrences:   1     Order Specific Question:   Appointment in     Answer:   6 Weeks        Signed By:  Deejay Bolden MD     2023

## 2023-03-16 NOTE — ROUTINE PROCESS
Bedside shift change report given to SUKI Early (oncoming nurse) by Jenna Mendez. [address]. Erick Key RN (offgoing nurse). Report included the following information SBAR, Intake/Output, and MAR.

## 2023-03-16 NOTE — DISCHARGE INSTRUCTIONS
POST DELIVERY DISCHARGE INSTRUCTIONS    Name: La Alcaraz  YOB: 1995  Primary Diagnosis: Active Problems:    Irregular labor (3/14/2023)        General:     Diet/Diet Restrictions:  Eight 8-ounce glasses of fluid daily (water, juices); avoid excessive caffeine intake. Meals/snacks as desired which are high in fiber and carbohydrates and low in fat and cholesterol. Medications:         Physical Activity / Restrictions / Safety:     Avoid heavy lifting, no more that 8 lbs. For 2-3 weeks;   Limit use of stairs to 2 times daily for the first week home. No driving for one week. Avoid intercourse 4-6 weeks, no douching or tampon use. Check with obstetrician before starting or resuming an exercise program.      Discharge Instructions/Special Treatment/Home Care Needs:     Continue prenatal vitamins. Continue to use squirt bottle with warm water on your episiotomy after each bathroom use until bleeding stops. If steri-strips applied to your incision, remove in 7-10 days. Call your doctor for the following:     Fever over 101 degrees by mouth. Vaginal bleeding heavier than a normal menstrual period or clots larger than a golf ball. Red streaks or increased swelling of legs, painful red streaks on your breast.  Painful urination, constipation and increased pain or swelling or discharge with your incision. If you feel extremely anxious or overwhelmed. If you have thoughts of harming yourself and/or your baby. Pain Management:     Take Acetaminophen (Tylenol) or Ibuprofen (Advil, Motrin), as directed for pain. Use a warm Sitz bath 3 times daily to relieve episiotomy or hemorrhoidal discomfort. For hemorrhoidal discomfort, use Tucks and Anusol cream as needed and directed. Heating pad to  incision as needed.      Follow-Up Care:     Appointment with MD: [unfilled]  Telephone number: [phone]    Signed By: Penny Kurtz MD Date: 3/16/2023 Time: 9:42 AM      After Your Delivery (the Postpartum Period): Care Instructions  Overview     Congratulations on the birth of your baby. Like pregnancy, the  period can be a time of excitement, rena, and exhaustion. You may look at your wondrous little baby and feel happy. You may also be overwhelmed by your new sleep hours and new responsibilities. At first, babies often sleep during the days and are awake at night. They do not have a pattern or routine. They may make sudden gasps, jerk themselves awake, or look like they have crossed eyes. These are all normal, and they may even make you smile. In these first weeks after delivery, try to take good care of yourself. It may take 4 to 6 weeks to feel like yourself again, and possibly longer if you had a  birth. You will likely feel very tired for several weeks. Your days will be full of ups and downs, but lots of rena as well. Follow-up care is a key part of your treatment and safety. Be sure to make and go to all appointments, and call your doctor if you are having problems. It's also a good idea to know your test results and keep a list of the medicines you take. How can you care for yourself at home? Take care of your body after delivery  Use pads instead of tampons for the bloody flow that may last as long as 2 weeks. Ease cramps with ibuprofen (Advil, Motrin). Ease soreness of hemorrhoids and the area between your vagina and rectum with ice compresses or witch hazel pads. Ease constipation by drinking lots of fluid and eating high-fiber foods. Ask your doctor about over-the-counter stool softeners. Cleanse yourself with a gentle squeeze of warm water from a bottle instead of wiping with toilet paper. Take a sitz bath in warm water several times a day. Wear a good nursing bra. Ease sore and swollen breasts with warm, wet washcloths.   If you aren't breastfeeding, use ice rather than heat for breast soreness. Your period may not start for several months if you are breastfeeding. You may bleed more, and longer at first, than you did before you got pregnant. Wait until you are healed (about 4 to 6 weeks) before you have sex. Ask your doctor when it is okay for you to have sex. Try not to travel with your baby for 5 or 6 weeks. If you take a long car trip, make frequent stops to walk around and stretch. Avoid exhaustion  Rest every day. Try to nap when your baby naps. Ask another adult to be with you for a few days after delivery. Plan for  if you have other children. Stay flexible so you can eat at odd hours and sleep when you need to. Both you and your baby are making new schedules. Plan small trips to get out of the house. Change can make you feel less tired. Ask for help with housework, cooking, and shopping. Remind yourself that your job is to care for your baby. Know about help for postpartum depression  \"Baby blues\" are common for the first 1 to 2 weeks after birth. You may cry or feel sad or irritable for no reason. Rest whenever you can. Being tired makes it harder to handle your emotions. Go for walks with your baby. Talk to your partner, friends, and family about your feelings. If your symptoms last for more than a few weeks, or if you feel very depressed, ask your doctor for help. Postpartum depression can be treated. Support groups and counseling can help. Sometimes medicine can also help. Stay healthy  Eat healthy foods so you have more energy. If you breastfeed, avoid drugs. If you quit smoking during pregnancy, try to stay smoke-free. If you choose to have a drink now and then, have only one drink, and limit the number of occasions that you have a drink. Wait to breastfeed at least 2 hours after you have a drink to reduce the amount of alcohol the baby may get in the milk.   Start daily exercise after 4 to 6 weeks, but rest when you feel tired.  Learn exercises to tone your belly. Try Kegel exercises to regain strength in your pelvic muscles. You can do these exercises while you stand or sit. (If doing these exercises causes pain, stop doing them and talk with your doctor.)  Squeeze your muscles as if you were trying not to pass gas. Or squeeze your muscles as if you were stopping the flow of urine. Your belly, legs, and buttocks shouldn't move. Hold the squeeze for 3 seconds, then relax for 5 to 10 seconds. Start with 3 seconds, then add 1 second each week until you are able to squeeze for 10 seconds. Repeat the exercise 10 times a session. Do 3 to 8 sessions a day. Find a class for you and your baby that has an exercise time. If you had a  birth, give yourself a bit more time before you exercise, and be careful. When should you call for help? Share this information with your partner, family, or a friend. They can help you watch for warning signs. Call 911  anytime you think you may need emergency care. For example, call if:    You have thoughts of harming yourself, your baby, or another person. You passed out (lost consciousness). You have chest pain, are short of breath, or cough up blood. You have a seizure. Call your doctor now or seek immediate medical care if:    You have signs of hemorrhage (too much bleeding), such as:  Heavy vaginal bleeding. This means that you are soaking through one or more pads in an hour. Or you pass blood clots bigger than an egg. Feeling dizzy or lightheaded, or you feel like you may faint. Feeling so tired or weak that you cannot do your usual activities. A fast or irregular heartbeat. New or worse belly pain. You have signs of infection, such as:  A fever. Vaginal discharge that smells bad. New or worse belly pain. You have symptoms of a blood clot in your leg (called a deep vein thrombosis), such as:  Pain in the calf, back of the knee, thigh, or groin.   Redness and swelling in your leg or groin. You have signs of preeclampsia, such as:  Sudden swelling of your face, hands, or feet. New vision problems (such as dimness, blurring, or seeing spots). A severe headache. Watch closely for changes in your health, and be sure to contact your doctor if:    Your vaginal bleeding isn't decreasing. You feel sad, anxious, or hopeless for more than a few days. You are having problems with your breasts or breastfeeding. Where can you learn more? Go to http://www.spears.com/  Enter A461 in the search box to learn more about \"After Your Delivery (the Postpartum Period): Care Instructions. \"  Current as of: February 23, 2022               Content Version: 13.4  © 2006-2022 [employer]. Care instructions adapted under license by [employer] (which disclaims liability or warranty for this information). If you have questions about a medical condition or this instruction, always ask your healthcare professional. [place] [address] any warranty or liability for your use of this information.

## 2023-03-16 NOTE — LACTATION NOTE
This note was copied from a baby's chart.     23 0944   Visit Information   Lactation Consult Visit Type IP Consult Follow Up   Visit Length 15 minutes   Referral Received From Lactation Consultant Follow-up   Reason for Visit Normal [place] Visit;Education   Breast- Feeding Assessment   Breast-Feeding Experience No   Breast Trauma/Surgery   (Motif Duo breast pump; Measured for 19-21mm flanges)   Breast Assessment   Left Breast   (Declined)

## 2023-06-19 ENCOUNTER — OFFICE VISIT (OUTPATIENT)
Age: 28
End: 2023-06-19

## 2023-06-19 VITALS
HEART RATE: 79 BPM | TEMPERATURE: 97.9 F | RESPIRATION RATE: 17 BRPM | BODY MASS INDEX: 24.83 KG/M2 | OXYGEN SATURATION: 99 % | SYSTOLIC BLOOD PRESSURE: 102 MMHG | WEIGHT: 178 LBS | DIASTOLIC BLOOD PRESSURE: 66 MMHG

## 2023-06-19 DIAGNOSIS — L03.031 PARONYCHIA, TOE, RIGHT: Primary | ICD-10-CM

## 2023-06-19 RX ORDER — CEPHALEXIN 500 MG/1
500 CAPSULE ORAL 2 TIMES DAILY
Qty: 14 CAPSULE | Refills: 0 | Status: SHIPPED | OUTPATIENT
Start: 2023-06-19 | End: 2023-06-26

## 2023-06-19 RX ORDER — DROSPIRENONE AND ETHINYL ESTRADIOL 0.03MG-3MG
1 KIT ORAL DAILY
COMMUNITY
Start: 2023-05-05

## 2023-06-19 NOTE — PROGRESS NOTES
Subjective: (As above and below)     The patient/guardian gave verbal consent to treat. Chief Complaint   Patient presents with    Toe Pain     Patient presents for (r) big toe pain, no injuries or falls. Patient states the pain has been lingering for the last month with no relief, swelling and there was puss coming from the toe. Coral Mosley is a 25 y.o. female who presents for evaluation of :   Sore right great toe  Onset over past few days  Noted some pus to express near nail fold. No fever, chills and feels well otherwise  Denies any injury. Review of Systems    Review of Systems - negative except as listed above    Reviewed PmHx, RxHx, FmHx, SocHx, AllgHx and updated in chart. Family History   Problem Relation Age of Onset    Heart Disease Father     Hypertension Father     Diabetes Mother        Past Medical History:   Diagnosis Date    Abnormal Papanicolaou smear of cervix     Anemia     Bronchiolitis 03/03/2010    Eczema 03/03/2010    Strep throat 03/03/2010    UTI (urinary tract infection) 03/03/2010      Social History     Socioeconomic History    Marital status: Single     Spouse name: None    Number of children: None    Years of education: None    Highest education level: None   Tobacco Use    Smoking status: Never     Passive exposure: Never    Smokeless tobacco: Never   Vaping Use    Vaping Use: Never used   Substance and Sexual Activity    Alcohol use: Yes     Comment: occ    Drug use: No    Sexual activity: Yes          Current Outpatient Medications   Medication Sig    JOEL 3-0.03 MG TABS Take 1 tablet by mouth daily    cephALEXin (KEFLEX) 500 MG capsule Take 1 capsule by mouth 2 times daily for 7 days     No current facility-administered medications for this visit.        Objective:     Vitals:    06/19/23 1350   BP: 102/66   Site: Right Upper Arm   Position: Sitting   Cuff Size: Medium Adult   Pulse: 79   Resp: 17   Temp: 97.9 °F (36.6 °C)   TempSrc: Temporal   SpO2:

## 2023-11-09 ENCOUNTER — HOSPITAL ENCOUNTER (EMERGENCY)
Facility: HOSPITAL | Age: 28
Discharge: HOME | End: 2023-11-09
Attending: STUDENT IN AN ORGANIZED HEALTH CARE EDUCATION/TRAINING PROGRAM
Payer: COMMERCIAL

## 2023-11-09 VITALS
HEART RATE: 92 BPM | SYSTOLIC BLOOD PRESSURE: 114 MMHG | OXYGEN SATURATION: 98 % | RESPIRATION RATE: 16 BRPM | DIASTOLIC BLOOD PRESSURE: 66 MMHG | TEMPERATURE: 99.4 F

## 2023-11-09 DIAGNOSIS — R51.9 SINUS HEADACHE: ICD-10-CM

## 2023-11-09 DIAGNOSIS — B34.9 VIRAL SYNDROME: Primary | ICD-10-CM

## 2023-11-09 LAB
FLUAV AG NPH QL IA: NEGATIVE
FLUBV AG NOSE QL IA: NEGATIVE

## 2023-11-09 PROCEDURE — 99284 EMERGENCY DEPT VISIT MOD MDM: CPT

## 2023-11-09 PROCEDURE — 96375 TX/PRO/DX INJ NEW DRUG ADDON: CPT

## 2023-11-09 PROCEDURE — 6360000002 HC RX W HCPCS: Performed by: STUDENT IN AN ORGANIZED HEALTH CARE EDUCATION/TRAINING PROGRAM

## 2023-11-09 PROCEDURE — 96361 HYDRATE IV INFUSION ADD-ON: CPT

## 2023-11-09 PROCEDURE — 87804 INFLUENZA ASSAY W/OPTIC: CPT

## 2023-11-09 PROCEDURE — 87635 SARS-COV-2 COVID-19 AMP PRB: CPT

## 2023-11-09 PROCEDURE — 6370000000 HC RX 637 (ALT 250 FOR IP): Performed by: STUDENT IN AN ORGANIZED HEALTH CARE EDUCATION/TRAINING PROGRAM

## 2023-11-09 PROCEDURE — 2580000003 HC RX 258: Performed by: STUDENT IN AN ORGANIZED HEALTH CARE EDUCATION/TRAINING PROGRAM

## 2023-11-09 PROCEDURE — 96374 THER/PROPH/DIAG INJ IV PUSH: CPT

## 2023-11-09 RX ORDER — 0.9 % SODIUM CHLORIDE 0.9 %
1000 INTRAVENOUS SOLUTION INTRAVENOUS ONCE
Status: COMPLETED | OUTPATIENT
Start: 2023-11-09 | End: 2023-11-09

## 2023-11-09 RX ORDER — METOCLOPRAMIDE HYDROCHLORIDE 5 MG/ML
10 INJECTION INTRAMUSCULAR; INTRAVENOUS ONCE
Status: COMPLETED | OUTPATIENT
Start: 2023-11-09 | End: 2023-11-09

## 2023-11-09 RX ORDER — DIPHENHYDRAMINE HYDROCHLORIDE 50 MG/ML
25 INJECTION INTRAMUSCULAR; INTRAVENOUS
Status: COMPLETED | OUTPATIENT
Start: 2023-11-09 | End: 2023-11-09

## 2023-11-09 RX ORDER — ACETAMINOPHEN 325 MG/1
650 TABLET ORAL ONCE
Status: COMPLETED | OUTPATIENT
Start: 2023-11-09 | End: 2023-11-09

## 2023-11-09 RX ORDER — IBUPROFEN 600 MG/1
600 TABLET ORAL EVERY 8 HOURS PRN
Qty: 20 TABLET | Refills: 0 | Status: SHIPPED | OUTPATIENT
Start: 2023-11-09 | End: 2023-11-16

## 2023-11-09 RX ORDER — ONDANSETRON 4 MG/1
4 TABLET, ORALLY DISINTEGRATING ORAL EVERY 8 HOURS PRN
Qty: 20 TABLET | Refills: 0 | Status: SHIPPED | OUTPATIENT
Start: 2023-11-09 | End: 2023-11-16

## 2023-11-09 RX ORDER — KETOROLAC TROMETHAMINE 30 MG/ML
15 INJECTION, SOLUTION INTRAMUSCULAR; INTRAVENOUS
Status: COMPLETED | OUTPATIENT
Start: 2023-11-09 | End: 2023-11-09

## 2023-11-09 RX ADMIN — DIPHENHYDRAMINE HYDROCHLORIDE 25 MG: 50 INJECTION INTRAMUSCULAR; INTRAVENOUS at 08:03

## 2023-11-09 RX ADMIN — KETOROLAC TROMETHAMINE 15 MG: 30 INJECTION, SOLUTION INTRAMUSCULAR; INTRAVENOUS at 08:03

## 2023-11-09 RX ADMIN — ACETAMINOPHEN 650 MG: 325 TABLET ORAL at 08:02

## 2023-11-09 RX ADMIN — METOCLOPRAMIDE 10 MG: 5 INJECTION, SOLUTION INTRAMUSCULAR; INTRAVENOUS at 08:04

## 2023-11-09 RX ADMIN — SODIUM CHLORIDE 1000 ML: 9 INJECTION, SOLUTION INTRAVENOUS at 08:03

## 2023-11-09 ASSESSMENT — PAIN DESCRIPTION - PAIN TYPE: TYPE: ACUTE PAIN

## 2023-11-09 ASSESSMENT — PAIN DESCRIPTION - FREQUENCY: FREQUENCY: INTERMITTENT

## 2023-11-09 ASSESSMENT — LIFESTYLE VARIABLES
HOW MANY STANDARD DRINKS CONTAINING ALCOHOL DO YOU HAVE ON A TYPICAL DAY: PATIENT DOES NOT DRINK
HOW OFTEN DO YOU HAVE A DRINK CONTAINING ALCOHOL: NEVER

## 2023-11-09 ASSESSMENT — PAIN SCALES - GENERAL
PAINLEVEL_OUTOF10: 10
PAINLEVEL_OUTOF10: 3

## 2023-11-09 ASSESSMENT — PAIN DESCRIPTION - ONSET: ONSET: SUDDEN

## 2023-11-09 ASSESSMENT — PAIN - FUNCTIONAL ASSESSMENT
PAIN_FUNCTIONAL_ASSESSMENT: 0-10
PAIN_FUNCTIONAL_ASSESSMENT: ACTIVITIES ARE NOT PREVENTED

## 2023-11-09 ASSESSMENT — PAIN DESCRIPTION - DESCRIPTORS: DESCRIPTORS: ACHING;DISCOMFORT

## 2023-11-09 ASSESSMENT — PAIN DESCRIPTION - LOCATION: LOCATION: HEAD

## 2023-11-09 NOTE — ED TRIAGE NOTES
Pt arrives from home w/ CC headache, sinus pressure, & overall feels unwell for the past few days. Pt also endorses nausea & sore throat.   A&Ox4

## 2023-11-09 NOTE — ED PROVIDER NOTES
[facility] [place] [facility] DEP  EMERGENCY DEPARTMENT ENCOUNTER      Pt Name: Chirag Lewis  MRN: [id number]  [address] [facility] 1995  Date of evaluation: 11/9/2023  Provider: Kendy Taylor MD    CHIEF COMPLAINT       Chief Complaint   Patient presents with    Headache    Nausea       HISTORY OF PRESENT ILLNESS    HPI  59-year-old female with no relevant past medical history presenting for headache, upper respiratory symptoms. Has had chills, throat pain, frontal sinus pressure, myalgias for past few days. She then began developing a headach for past few days that worsened this morning. Dayquil at 6am. On OCP, no other meds. Not breasfeeding, 10onth old at home who has a similar illness. She denies any chest pain or shortness of breath. No neck stiffness. She does report nausea but no vomiting or diarrhea. Nursing notes reviewed. REVIEW OF SYSTEMS     Review of Systems  Unless otherwise stated, a complete review of systems was asked of the patient. Pertinent positives are noted in the HPI section.     PAST MEDICAL HISTORY     Past Medical History:   Diagnosis Date    Abnormal Papanicolaou smear of cervix     Anemia     Bronchiolitis 03/03/2010    Eczema 03/03/2010    Strep throat 03/03/2010    UTI (urinary tract infection) 03/03/2010       SURGICAL HISTORY       Past Surgical History:   Procedure Laterality Date    HEENT      lasik        CURRENT MEDICATIONS       Discharge Medication List as of 11/9/2023  8:40 AM        CONTINUE these medications which have NOT CHANGED    Details   JOEL 3-0.03 MG TABS Take 1 tablet by mouth daily, [facility]istor[facility] Med             ALLERGIES     Amoxicillin, Griseofulvin, and Penicillins    FAMILY HISTORY       Family History   Problem Relation Age of Onset    Heart Disease Father     Hypertension Father     Diabetes Mother         SOCIAL HISTORY       Social History     Socioeconomic History    Marital status: Single   Tobacco Use    Smoking status: Never     Passive exposure: Never

## 2023-11-09 NOTE — ED NOTES
Patient left ED in no acute distress, alert and oriented x4. Patient was encouraged to come back if symptoms get worse. Patient was provided with discharge instructions and prescriptions. All questions were answered. Patient left ambulatory.          Justin Bettencourt [place]  47/59/32 [id number]

## 2023-11-10 LAB
SARS-COV-2 RNA RESP QL NAA+PROBE: NOT DETECTED
SOURCE: NORMAL